# Patient Record
Sex: MALE | Race: BLACK OR AFRICAN AMERICAN | Employment: FULL TIME | URBAN - METROPOLITAN AREA
[De-identification: names, ages, dates, MRNs, and addresses within clinical notes are randomized per-mention and may not be internally consistent; named-entity substitution may affect disease eponyms.]

---

## 2017-05-31 ENCOUNTER — OFFICE VISIT (OUTPATIENT)
Dept: FAMILY MEDICINE CLINIC | Age: 22
End: 2017-05-31

## 2017-05-31 VITALS
WEIGHT: 282.2 LBS | HEART RATE: 89 BPM | TEMPERATURE: 97.5 F | DIASTOLIC BLOOD PRESSURE: 87 MMHG | RESPIRATION RATE: 18 BRPM | OXYGEN SATURATION: 96 % | HEIGHT: 72 IN | SYSTOLIC BLOOD PRESSURE: 124 MMHG | BODY MASS INDEX: 38.22 KG/M2

## 2017-05-31 DIAGNOSIS — J02.9 PHARYNGITIS, UNSPECIFIED ETIOLOGY: Primary | ICD-10-CM

## 2017-05-31 RX ORDER — AMOXICILLIN 500 MG/1
500 CAPSULE ORAL 2 TIMES DAILY
Qty: 20 CAP | Refills: 0 | Status: SHIPPED | OUTPATIENT
Start: 2017-05-31 | End: 2017-06-10

## 2017-05-31 NOTE — PROGRESS NOTES
Estephania Craft is a 24 y.o. male who is being seen  today for an acute care visit  today (5/31/2017). Assessments and plans as follows:     Assessment & Plan:  Judd Coulter was seen today for sore throat. Diagnoses and all orders for this visit:    Pharyngitis, unspecified etiology  -     amoxicillin (AMOXIL) 500 mg capsule; Take 1 Cap by mouth two (2) times a day for 10 days. Indications: PHARYNGITIS         ----------------------------------------------------------------------    Subjective / HPI:  Estephania Craft presents to office today for an acute care visit for sore throat that started yesterday he attempted home remedy without success. NO fevers, or chills , positive abdominal cramps that comes and goes. No ear or eye problems. Prior to Admission medications    Medication Sig Start Date End Date Taking? Authorizing Provider   amoxicillin (AMOXIL) 500 mg capsule Take 1 Cap by mouth two (2) times a day for 10 days. Indications: PHARYNGITIS 5/31/17 6/10/17 Yes Oxford Amee, NP   fluticasone (FLONASE) 50 mcg/actuation nasal spray 2 sprays each nostril BID for 3 days then 2 sprays each nostril daily for additional 11 days. 10/28/16  Yes Rosalentravis Stone, DO          No Known Allergies        ROS    See HPI    History reviewed. No pertinent past medical history. Visit Vitals    /87 (BP 1 Location: Left arm, BP Patient Position: Sitting)    Pulse 89    Temp 97.5 °F (36.4 °C) (Oral)    Resp 18    Ht 6' (1.829 m)    Wt 282 lb 3.2 oz (128 kg)    SpO2 96%    BMI 38.27 kg/m2       Objective:   Physical Exam   Constitutional: He is oriented to person, place, and time. He appears well-developed and well-nourished. HENT:   Mouth/Throat: Posterior oropharyngeal edema and posterior oropharyngeal erythema present.    Pulmonary/Chest: Effort normal. Lymphadenopathy:     He has cervical adenopathy. Neurological: He is alert and oriented to person, place, and time. Skin: Skin is warm and dry. Psychiatric: He has a normal mood and affect. Disclaimer:  Advised him to call back or return to office if symptoms worsen/change/persist.  Discussed expected course/resolution/complications of diagnosis in detail with patient. Medication risks/benefits/costs/interactions/alternatives discussed with patient. He was given an after visit summary which includes diagnoses, current medications, & vitals. He expressed understanding with the diagnosis and plan.         Electronic Signature  Toni Rojas NP , [facility]  [phone]

## 2017-05-31 NOTE — PATIENT INSTRUCTIONS
Sore Throat: Care Instructions  Your Care Instructions    Infection by bacteria or a virus causes most sore throats. Cigarette smoke, dry air, air pollution, allergies, and yelling can also cause a sore throat. Sore throats can be painful and annoying. Fortunately, most sore throats go away on their own. If you have a bacterial infection, your doctor may prescribe antibiotics. Follow-up care is a key part of your treatment and safety. Be sure to make and go to all appointments, and call your doctor if you are having problems. It's also a good idea to know your test results and keep a list of the medicines you take. How can you care for yourself at home? · If your doctor prescribed antibiotics, take them as directed. Do not stop taking them just because you feel better. You need to take the full course of antibiotics. · Gargle with warm salt water once an hour to help reduce swelling and relieve discomfort. Use 1 teaspoon of salt mixed in 1 cup of warm water. · Take an over-the-counter pain medicine, such as acetaminophen (Tylenol), ibuprofen (Advil, Motrin), or naproxen (Aleve). Read and follow all instructions on the label. · Be careful when taking over-the-counter cold or flu medicines and Tylenol at the same time. Many of these medicines have acetaminophen, which is Tylenol. Read the labels to make sure that you are not taking more than the recommended dose. Too much acetaminophen (Tylenol) can be harmful. · Drink plenty of fluids. Fluids may help soothe an irritated throat. Hot fluids, such as tea or soup, may help decrease throat pain. · Use over-the-counter throat lozenges to soothe pain. Regular cough drops or hard candy may also help. These should not be given to young children because of the risk of choking. · Do not smoke or allow others to smoke around you. If you need help quitting, talk to your doctor about stop-smoking programs and medicines.  These can increase your chances of quitting for good. · Use a vaporizer or humidifier to add moisture to your bedroom. Follow the directions for cleaning the machine. When should you call for help? Call your doctor now or seek immediate medical care if:  · You have new or worse trouble swallowing. · Your sore throat gets much worse on one side. Watch closely for changes in your health, and be sure to contact your doctor if you do not get better as expected. Where can you learn more? Go to http://darin-carol.info/. Enter 062 [id number] 80 [id number] in the search box to learn more about \"Sore Throat: Care Instructions. \"  Current as of: July 29, 2016  Content Version: 11.2  © 8001-0669 [employer]. Care instructions adapted under license by [employer] (which disclaims liability or warranty for this information). If you have questions about a medical condition or this instruction, always ask your healthcare professional. [address] any warranty or liability for your use of this information.

## 2017-05-31 NOTE — MR AVS SNAPSHOT
Visit Information Date & Time Provider Department Dept. Phone Encounter #  
 5/31/2017  2:40 PM Caden Shannon  [facility] [phone] [id number] Your Appointments 7/21/2017  9:00 AM  
COMPLETE PHYSICAL with Renita Gregorio MD  
[facility]) Appt Note: cpe  
 [address] [phone]  
[phone]  
  
   
 [address] [address] Upcoming Health Maintenance Date Due DTaP/Tdap/Td series (1 - Tdap) 8/9/2015 INFLUENZA AGE 9 TO ADULT 8/1/2017 Allergies as of 5/31/2017  Review Complete On: 5/31/2017 By: Caden Shannon NP No Known Allergies Current Immunizations  Reviewed on 10/28/2016 No immunizations on file. Not reviewed this visit You Were Diagnosed With   
  
 Codes Comments Pharyngitis, unspecified etiology    -  Primary ICD-10-CM: J02.9 ICD-9-CM: 982 Vitals BP Pulse Temp Resp Height(growth percentile) Weight(growth percentile) 124/87 (BP 1 Location: Left arm, BP Patient Position: Sitting) 89 97.5 °F (36.4 °C) (Oral) 18 6' (1.829 m) 282 lb 3.2 oz (128 kg) SpO2 BMI Smoking Status 96% 38.27 kg/m2 Never Smoker Vitals History BMI and BSA Data Body Mass Index Body Surface Area  
 38.27 kg/m 2 2.55 m 2 Preferred Pharmacy Pharmacy Name Phone [id number] [address] [phone] Your Updated Medication List  
  
   
This list is accurate as of: 5/31/17  3:02 PM.  Always use your most recent med list.  
  
  
  
  
 amoxicillin 500 mg capsule Commonly known as:  AMOXIL Take 1 Cap by mouth two (2) times a day for 10 days. Indications: PHARYNGITIS  
  
 fluticasone 50 mcg/actuation nasal spray Commonly known as:  FLONASE  
2 sprays each nostril BID for 3 days then 2 sprays each nostril daily for additional 11 days. Prescriptions Sent to Pharmacy Refills  
 amoxicillin (AMOXIL) 500 mg capsule 0 Sig: Take 1 Cap by mouth two (2) times a day for 10 days. Indications: PHARYNGITIS Class: Normal  
 Pharmacy: [id number] #: [phone] Route: Oral  
  
Patient Instructions Sore Throat: Care Instructions Your Care Instructions Infection by bacteria or a virus causes most sore throats. Cigarette smoke, dry air, air pollution, allergies, and yelling can also cause a sore throat. Sore throats can be painful and annoying. Fortunately, most sore throats go away on their own. If you have a bacterial infection, your doctor may prescribe antibiotics. Follow-up care is a key part of your treatment and safety. Be sure to make and go to all appointments, and call your doctor if you are having problems. It's also a good idea to know your test results and keep a list of the medicines you take. How can you care for yourself at home? · If your doctor prescribed antibiotics, take them as directed. Do not stop taking them just because you feel better. You need to take the full course of antibiotics. · Gargle with warm salt water once an hour to help reduce swelling and relieve discomfort. Use 1 teaspoon of salt mixed in 1 cup of warm water. · Take an over-the-counter pain medicine, such as acetaminophen (Tylenol), ibuprofen (Advil, Motrin), or naproxen (Aleve). Read and follow all instructions on the label. · Be careful when taking over-the-counter cold or flu medicines and Tylenol at the same time. Many of these medicines have acetaminophen, which is Tylenol. Read the labels to make sure that you are not taking more than the recommended dose. Too much acetaminophen (Tylenol) can be harmful. · Drink plenty of fluids. Fluids may help soothe an irritated throat. Hot fluids, such as tea or soup, may help decrease throat pain. · Use over-the-counter throat lozenges to soothe pain. Regular cough drops or hard candy may also help. These should not be given to young children because of the risk of choking. · Do not smoke or allow others to smoke around you. If you need help quitting, talk to your doctor about stop-smoking programs and medicines. These can increase your chances of quitting for good. · Use a vaporizer or humidifier to add moisture to your bedroom. Follow the directions for cleaning the machine. When should you call for help? Call your doctor now or seek immediate medical care if: 
· You have new or worse trouble swallowing. · Your sore throat gets much worse on one side. Watch closely for changes in your health, and be sure to contact your doctor if you do not get better as expected. Where can you learn more? Go to http://adrin-carol.info/. Enter [id number] in the search box to learn more about \"Sore Throat: Care Instructions. \" Current as of: July 29, 2016 Content Version: 11.2 © 9165-5772 [employer]. Care instructions adapted under license by [employer] (which disclaims liability or warranty for this information). If you have questions about a medical condition or this instruction, always ask your healthcare professional. [address] any warranty or liability for your use of this information. Introducing [facility]! Brando Esteves introduces [employer] patient portal. Now you can access parts of your medical record, email your doctor's office, and request medication refills online. 1. In your internet browser, go to [email or web address] 2. Click on the First Time User? Click Here link in the Sign In box. You will see the New Member Sign Up page. 3. Enter your [employer] Access Code exactly as it appears below. You will not need to use this code after youve completed the sign-up process.  If you do not sign up before the expiration date, you must request a new code. · Lytics Access Code: [address] [place] Expires: 8/29/2017  2:59 PM 
 
4. Enter the last four digits of your Social Security Number (xxxx) and Date of Birth (mm/dd/yyyy) as indicated and click Submit. You will be taken to the next sign-up page. 5. Create a Lytics ID. This will be your Lytics login ID and cannot be changed, so think of one that is secure and easy to remember. 6. Create a Lytics password. You can change your password at any time. 7. Enter your Password Reset Question and Answer. This can be used at a later time if you forget your password. 8. Enter your e-mail address. You will receive e-mail notification when new information is available in [address] 9. Click Sign Up. You can now view and download portions of your medical record. 10. Click the Download Summary menu link to download a portable copy of your medical information. If you have questions, please visit the Frequently Asked Questions section of the Lytics website. Remember, [employer] is NOT to be used for urgent needs. For medical emergencies, dial 911. Now available from your iPhone and Android! Please provide this summary of care documentation to your next provider. Your primary care clinician is listed as Merline Moody. If you have any questions after today's visit, please call [phone].

## 2017-05-31 NOTE — PROGRESS NOTES
\"Reviewed record in preparation for visit and have obtained the necessary documentation\"  Chief Complaint   Patient presents with    Sore Throat     Patient presents in the office today with complaints of sore throat and x 1 day    Patient denies any other symptoms, pain is currently 4-5/10 in throat     1. Have you been to the ER, urgent care clinic since your last visit? Hospitalized since your last visit? No    2. Have you seen or consulted any other health care providers outside of the [address] [place] [address] since your last visit? Include any pap smears or colon screening.  No

## 2017-06-29 ENCOUNTER — OFFICE VISIT (OUTPATIENT)
Dept: FAMILY MEDICINE CLINIC | Age: 22
End: 2017-06-29

## 2017-06-29 VITALS
RESPIRATION RATE: 14 BRPM | DIASTOLIC BLOOD PRESSURE: 87 MMHG | BODY MASS INDEX: 38.87 KG/M2 | TEMPERATURE: 98.3 F | HEART RATE: 85 BPM | WEIGHT: 287 LBS | OXYGEN SATURATION: 97 % | HEIGHT: 72 IN | SYSTOLIC BLOOD PRESSURE: 137 MMHG

## 2017-06-29 DIAGNOSIS — Z11.3 SCREEN FOR STD (SEXUALLY TRANSMITTED DISEASE): Primary | ICD-10-CM

## 2017-06-29 LAB
BILIRUB UR QL STRIP: NEGATIVE
GLUCOSE UR-MCNC: NEGATIVE MG/DL
KETONES P FAST UR STRIP-MCNC: NEGATIVE MG/DL
PH UR STRIP: 6 [PH] (ref 4.6–8)
PROT UR QL STRIP: NEGATIVE MG/DL
SP GR UR STRIP: 1.02 (ref 1–1.03)
UA UROBILINOGEN AMB POC: NORMAL (ref 0.2–1)
URINALYSIS CLARITY POC: CLEAR
URINALYSIS COLOR POC: YELLOW
URINE BLOOD POC: NEGATIVE
URINE LEUKOCYTES POC: NORMAL
URINE NITRITES POC: NEGATIVE

## 2017-06-29 NOTE — PROGRESS NOTES
Patient Name: Prosper Devries   MRN: [id number]    Fidel Galindo is a 24 y.o. male who presents with the following:     STD check  Patient reports that his girlfriend told him today that she has chlamydia. He reports that they are monogamous with one another. He states that in the past he has had intermittent lower abdominal pain but none recently. Denies current fevers, nausea, vomiting, dysuria, penile discharge, skin changes. States that he got all 3 HPV vaccines. Has not been tested for STDs in the past.      Review of Systems   Constitutional: Negative for fever, malaise/fatigue and weight loss. Respiratory: Negative for cough, hemoptysis, shortness of breath and wheezing. Cardiovascular: Negative for chest pain, palpitations, leg swelling and PND. Gastrointestinal: Negative for abdominal pain, constipation, diarrhea, nausea and vomiting. The patient's medications, allergies, past medical history, surgical history, family history and social history were reviewed and updated where appropriate. Prior to Admission medications    Medication Sig Start Date End Date Taking? Authorizing Provider   fluticasone (FLONASE) 50 mcg/actuation nasal spray 2 sprays each nostril BID for 3 days then 2 sprays each nostril daily for additional 11 days. 10/28/16   Denyce Maggie, DO       No Known Allergies        OBJECTIVE    Visit Vitals    /87 (BP 1 Location: Left arm, BP Patient Position: Sitting)    Pulse 85    Temp 98.3 °F (36.8 °C) (Oral)    Resp 14    Ht 6' (1.829 m)    Wt 287 lb (130.2 kg)    SpO2 97%    BMI 38.92 kg/m2       Physical Exam   Constitutional: He is oriented to person, place, and time and well-developed, well-nourished, and in no distress. No distress. HENT:   Head: Normocephalic and atraumatic. Right Ear: External ear normal.   Left Ear: External ear normal.   Eyes: Conjunctivae and EOM are normal. Pupils are equal, round, and reactive to light. Neurological: He is alert and oriented to person, place, and time. He exhibits normal muscle tone. Gait normal.   Skin: He is not diaphoretic. Psychiatric: Mood, memory, affect and judgment normal.   Nursing note and vitals reviewed. ASSESSMENT AND PLAN  Sarah Thornton is a 24 y.o. male who presents today for:    1. Screen for STD (sexually transmitted disease)  Will obtain STD panel today, and treat as needed. Patient remains asymptomatic at this time. - AMB POC URINALYSIS DIP STICK AUTO W/O MICRO  - CHLAMYDIA/GC PCR  - CULTURE, URINE  - HIV 1/2 AG/AB, 4TH GENERATION,W RFLX CONFIRM  - RPR  - HCV AB W/RFLX TO KIKA    There are no discontinued medications. Follow-up Disposition:  Return if symptoms worsen or fail to improve. Medication risks/benefits/costs/interactions/alternatives discussed with patient. Advised patient to call back or return to office if symptoms worsen/change/persist. If patient cannot reach us or should anything more severe/urgent arise he/she should proceed directly to the nearest emergency department. Discussed expected course/resolution/complications of diagnosis in detail with patient. Patient given a written after visit summary which includes his/her diagnoses, current medications and vitals. Patient expressed understanding with the diagnosis and plan.      Ely France M.D.

## 2017-06-29 NOTE — PROGRESS NOTES
1. Have you been to the ER, urgent care clinic since your last visit? Hospitalized since your last visit? No    2. Have you seen or consulted any other health care providers outside of the [address] [place] [address] since your last visit? Include any pap smears or colon screening. No       Chief Complaint   Patient presents with    Abdominal Pain     lower abdominal pain since yesterday- pain comes and goes. States it feels like pressure pain. Rate when pain comes- 5.       Not fasting

## 2017-06-29 NOTE — MR AVS SNAPSHOT
Visit Information Date & Time Provider Department Dept. Phone Encounter #  
 6/29/2017  4:00 PM Tyrone Romo  [place] [phone] [id number] Follow-up Instructions Return if symptoms worsen or fail to improve. Your Appointments 7/21/2017  9:00 AM  
COMPLETE PHYSICAL with [address], [place]  
[facility]) Appt Note: cpe  
 [address] [phone]  
[phone]  
  
   
 [address] [phone] Upcoming Health Maintenance Date Due DTaP/Tdap/Td series (1 - Tdap) 8/9/2015 INFLUENZA AGE 9 TO ADULT 8/1/2017 Allergies as of 6/29/2017  Review Complete On: 6/29/2017 By: Allie Martinez LPN No Known Allergies Current Immunizations  Reviewed on 10/28/2016 No immunizations on file. Not reviewed this visit You Were Diagnosed With   
  
 Codes Comments Lower abdominal pain    -  Primary ICD-10-CM: R10.30 ICD-9-CM: 789.09 Screen for STD (sexually transmitted disease)     ICD-10-CM: Z11.3 ICD-9-CM: V74.5 Vitals BP Pulse Temp Resp Height(growth percentile) Weight(growth percentile) 137/87 (BP 1 Location: Left arm, BP Patient Position: Sitting) 85 98.3 °F (36.8 °C) (Oral) 14 6' (1.829 m) 287 lb (130.2 kg) SpO2 BMI Smoking Status 97% 38.92 kg/m2 Never Smoker Vitals History BMI and BSA Data Body Mass Index Body Surface Area  
 38.92 kg/m 2 2.57 m 2 Preferred Pharmacy Pharmacy Name Phone [id number], [address] [place] [address] [phone] Your Updated Medication List  
  
   
This list is accurate as of: 6/29/17  4:52 PM.  Always use your most recent med list.  
  
  
  
  
 fluticasone 50 mcg/actuation nasal spray Commonly known as:  FLONASE  
2 sprays each nostril BID for 3 days then 2 sprays each nostril daily for additional 11 days. We Performed the Following AMB POC URINALYSIS DIP STICK AUTO W/O MICRO [59591 CPT(R)] CHLAMYDIA/GC PCR [35192 CPT(R)] CULTURE, URINE [id number] CPT(R)] HCV AB W/RFLX TO KIKA [18468 CPT(R)] HIV 1/2 AG/AB, 4TH GENERATION,W RFLX CONFIRM [[id number] Custom] RPR [30480 CPT(R)] Follow-up Instructions Return if symptoms worsen or fail to improve. Patient Instructions Exposure to Sexually Transmitted Infections: Care Instructions Your Care Instructions Sexually transmitted infections (STIs) are those diseases spread by sexual contact. There are at least 20 different STIs, including chlamydia, gonorrhea, syphilis, and human immunodeficiency virus (HIV), which causes AIDS. Bacteria-caused STIs can be treated and cured. STIs caused by viruses, such as HIV, can be treated but not cured. Some STIs can reduce a woman's chances of getting pregnant in the future. STIs are spread during sexual contact, such as vaginal intercourse and oral or anal sex. Follow-up care is a key part of your treatment and safety. Be sure to make and go to all appointments, and call your doctor if you are having problems. Its also a good idea to know your test results and keep a list of the medicines you take. How can you care for yourself at home? · Your doctor may have given you a shot of antibiotics. If your doctor prescribed antibiotic pills, take them as directed. Do not stop taking them just because you feel better. You need to take the full course of antibiotics. · Do not have sexual contact while you have symptoms of an STI or are being treated for an STI. · Tell your sex partner (or partners) that he or she will need treatment. · If you are a woman, do not douche. Douching changes the normal balance of bacteria in the vagina and may spread an infection up into your reproductive organs. To prevent exposure to STIs in the future · Use latex condoms every time you have sex. Use them from the beginning to the end of sexual contact. · Talk to your partner before you have sex. Find out if he or she has or is at risk for any STI. Keep in mind that a person may be able to spread an STI even if he or she does not have symptoms. · Do not have sex if you are being treated for an STI. · Do not have sex with anyone who has symptoms of an STI, such as sores on the genitals or mouth. · Having one sex partner (who does not have STIs and does not have sex with anyone else) is a good way to avoid STIs. When should you call for help? Call your doctor now or seek immediate medical care if: 
· You have new pain in your belly or pelvis. · You have symptoms of a urinary tract infection. These may include: 
¨ Pain or burning when you urinate. ¨ A frequent need to urinate without being able to pass much urine. ¨ Pain in the flank, which is just below the rib cage and above the waist on either side of the back. ¨ Blood in your urine. ¨ A fever. · You have new or worsening pain or swelling in the scrotum. Watch closely for changes in your health, and be sure to contact your doctor if: 
· You have unusual vaginal bleeding. · You have a discharge from the vagina or penis. · You have any new symptoms, such as sores, bumps, rashes, blisters, or warts. · You have itching, tingling, pain, or burning in the genital or anal area. · You think you may have an STI. Where can you learn more? Go to http://darin-carol.info/. Enter E725 in the search box to learn more about \"Exposure to Sexually Transmitted Infections: Care Instructions. \" Current as of: March 20, 2017 Content Version: 11.3 © 9613-5145 [employer]. Care instructions adapted under license by [employer] (which disclaims liability or warranty for this information).  If you have questions about a medical condition or this instruction, always ask your healthcare professional. [place] [address] any warranty or liability for your use of this information. Introducing [facility]! Brando Esteves introduces Soundl.ly patient portal. Now you can access parts of your medical record, email your doctor's office, and request medication refills online. 1. In your internet browser, go to [email or web address] 2. Click on the First Time User? Click Here link in the Sign In box. You will see the New Member Sign Up page. 3. Enter your Curate.Ust Access Code exactly as it appears below. You will not need to use this code after youve completed the sign-up process. If you do not sign up before the expiration date, you must request a new code. · Soundl.ly Access Code: [address] [place] [address] Dr Oakley Expires: 8/29/2017  2:59 PM 
 
4. Enter the last four digits of your Social Security Number (xxxx) and Date of Birth (mm/dd/yyyy) as indicated and click Submit. You will be taken to the next sign-up page. 5. Create a Soundl.ly ID. This will be your Soundl.ly login ID and cannot be changed, so think of one that is secure and easy to remember. 6. Create a Soundl.ly password. You can change your password at any time. 7. Enter your Password Reset Question and Answer. This can be used at a later time if you forget your password. 8. Enter your e-mail address. You will receive e-mail notification when new information is available in [address] 9. Click Sign Up. You can now view and download portions of your medical record. 10. Click the Download Summary menu link to download a portable copy of your medical information. If you have questions, please visit the Frequently Asked Questions section of the Soundl.ly website. Remember, Soundl.ly is NOT to be used for urgent needs. For medical emergencies, dial 911. Now available from your iPhone and Android! Please provide this summary of care documentation to your next provider. Your primary care clinician is listed as Adrianne Burleson. If you have any questions after today's visit, please call [phone].

## 2017-06-29 NOTE — PATIENT INSTRUCTIONS
Exposure to Sexually Transmitted Infections: Care Instructions  Your Care Instructions  Sexually transmitted infections (STIs) are those diseases spread by sexual contact. There are at least 20 different STIs, including chlamydia, gonorrhea, syphilis, and human immunodeficiency virus (HIV), which causes AIDS. Bacteria-caused STIs can be treated and cured. STIs caused by viruses, such as HIV, can be treated but not cured. Some STIs can reduce a woman's chances of getting pregnant in the future. STIs are spread during sexual contact, such as vaginal intercourse and oral or anal sex. Follow-up care is a key part of your treatment and safety. Be sure to make and go to all appointments, and call your doctor if you are having problems. Its also a good idea to know your test results and keep a list of the medicines you take. How can you care for yourself at home? · Your doctor may have given you a shot of antibiotics. If your doctor prescribed antibiotic pills, take them as directed. Do not stop taking them just because you feel better. You need to take the full course of antibiotics. · Do not have sexual contact while you have symptoms of an STI or are being treated for an STI. · Tell your sex partner (or partners) that he or she will need treatment. · If you are a woman, do not douche. Douching changes the normal balance of bacteria in the vagina and may spread an infection up into your reproductive organs. To prevent exposure to STIs in the future  · Use latex condoms every time you have sex. Use them from the beginning to the end of sexual contact. · Talk to your partner before you have sex. Find out if he or she has or is at risk for any STI. Keep in mind that a person may be able to spread an STI even if he or she does not have symptoms. · Do not have sex if you are being treated for an STI. · Do not have sex with anyone who has symptoms of an STI, such as sores on the genitals or mouth.   · Having one sex partner (who does not have STIs and does not have sex with anyone else) is a good way to avoid STIs. When should you call for help? Call your doctor now or seek immediate medical care if:  · You have new pain in your belly or pelvis. · You have symptoms of a urinary tract infection. These may include:  ¨ Pain or burning when you urinate. ¨ A frequent need to urinate without being able to pass much urine. ¨ Pain in the flank, which is just below the rib cage and above the waist on either side of the back. ¨ Blood in your urine. ¨ A fever. · You have new or worsening pain or swelling in the scrotum. Watch closely for changes in your health, and be sure to contact your doctor if:  · You have unusual vaginal bleeding. · You have a discharge from the vagina or penis. · You have any new symptoms, such as sores, bumps, rashes, blisters, or warts. · You have itching, tingling, pain, or burning in the genital or anal area. · You think you may have an STI. Where can you learn more? Go to http://darin-carol.info/. Enter P993 in the search box to learn more about \"Exposure to Sexually Transmitted Infections: Care Instructions. \"  Current as of: March 20, 2017  Content Version: 11.3  © 7212-9852 [employer]. Care instructions adapted under license by [employer] (which disclaims liability or warranty for this information). If you have questions about a medical condition or this instruction, always ask your healthcare professional. [place] [address] any warranty or liability for your use of this information.

## 2017-06-30 LAB
HCV AB S/CO SERPL IA: <0.1 S/CO RATIO (ref 0–0.9)
HCV AB SERPL QL IA: NORMAL
HIV 1+2 AB+HIV1 P24 AG SERPL QL IA: NON REACTIVE
RPR SER QL: NON REACTIVE

## 2017-07-01 LAB — BACTERIA UR CULT: NO GROWTH

## 2017-07-02 LAB
C TRACH RRNA SPEC QL NAA+PROBE: NEGATIVE
N GONORRHOEA RRNA SPEC QL NAA+PROBE: NEGATIVE

## 2017-07-03 ENCOUNTER — TELEPHONE (OUTPATIENT)
Dept: FAMILY MEDICINE CLINIC | Age: 22
End: 2017-07-03

## 2017-07-03 NOTE — PROGRESS NOTES
Please notify patient regarding their test results:    Negative STD testing including HIV, RPR, Hep C, gonorrhea and chlamydia. No treatment needed at this time. Pt to RTC prn new symptoms.

## 2017-07-03 NOTE — TELEPHONE ENCOUNTER
Patient is calling in regards to his most recent blood draw on 6/30/17, patient is requesting a call back with these results as soon as possible.     Best call back # for patient: [phone]

## 2017-12-26 ENCOUNTER — HOSPITAL ENCOUNTER (EMERGENCY)
Age: 22
Discharge: HOME | End: 2017-12-26
Attending: FAMILY MEDICINE

## 2017-12-26 VITALS
DIASTOLIC BLOOD PRESSURE: 93 MMHG | WEIGHT: 312.6 LBS | TEMPERATURE: 98.3 F | HEART RATE: 92 BPM | BODY MASS INDEX: 40.12 KG/M2 | SYSTOLIC BLOOD PRESSURE: 136 MMHG | OXYGEN SATURATION: 94 % | RESPIRATION RATE: 18 BRPM | HEIGHT: 74 IN

## 2017-12-26 DIAGNOSIS — K52.9 GASTROENTERITIS, ACUTE: Primary | ICD-10-CM

## 2017-12-26 RX ORDER — ONDANSETRON 4 MG/1
4 TABLET, ORALLY DISINTEGRATING ORAL
Status: COMPLETED | OUTPATIENT
Start: 2017-12-26 | End: 2017-12-26

## 2017-12-26 RX ADMIN — ONDANSETRON 4 MG: 4 TABLET, ORALLY DISINTEGRATING ORAL at 09:20

## 2017-12-26 NOTE — DISCHARGE INSTRUCTIONS
Gastroenteritis: Care Instructions  Your Care Instructions    Gastroenteritis is an illness that may cause nausea, vomiting, and diarrhea. It is sometimes called \"stomach flu. \" It can be caused by bacteria or a virus. You will probably begin to feel better in 1 to 2 days. In the meantime, get plenty of rest and make sure you do not become dehydrated. Dehydration occurs when your body loses too much fluid. Follow-up care is a key part of your treatment and safety. Be sure to make and go to all appointments, and call your doctor if you are having problems. It's also a good idea to know your test results and keep a list of the medicines you take. How can you care for yourself at home? · If your doctor prescribed antibiotics, take them as directed. Do not stop taking them just because you feel better. You need to take the full course of antibiotics. · Drink plenty of fluids to prevent dehydration, enough so that your urine is light yellow or clear like water. Choose water and other caffeine-free clear liquids until you feel better. If you have kidney, heart, or liver disease and have to limit fluids, talk with your doctor before you increase your fluid intake. · Drink fluids slowly, in frequent, small amounts, because drinking too much too fast can cause vomiting. · Begin eating mild foods, such as dry toast, yogurt, applesauce, bananas, and rice. Avoid spicy, hot, or high-fat foods, and do not drink alcohol or caffeine for a day or two. Do not drink milk or eat ice cream until you are feeling better. How to prevent gastroenteritis  · Keep hot foods hot and cold foods cold. · Do not eat meats, dressings, salads, or other foods that have been kept at room temperature for more than 2 hours. · Use a thermometer to check your refrigerator. It should be between 34°F and 40°F.  · Defrost meats in the refrigerator or microwave, not on the kitchen counter. · Keep your hands and your kitchen clean.  Wash your hands, cutting boards, and countertops with hot soapy water frequently. · Cook meat until it is well done. · Do not eat raw eggs or uncooked sauces made with raw eggs. · Do not take chances. If food looks or tastes spoiled, throw it out. When should you call for help? Call 911 anytime you think you may need emergency care. For example, call if:  ? · You vomit blood or what looks like coffee grounds. ? · You passed out (lost consciousness). ? · You pass maroon or very bloody stools. ?Call your doctor now or seek immediate medical care if:  ? · You have severe belly pain. ? · You have signs of needing more fluids. You have sunken eyes, a dry mouth, and pass only a little dark urine. ? · You feel like you are going to faint. ? · You have increased belly pain that does not go away in 1 to 2 days. ? · You have new or increased nausea, or you are vomiting. ? · You have a new or higher fever. ? · Your stools are black and tarlike or have streaks of blood. ? Watch closely for changes in your health, and be sure to contact your doctor if:  ? · You are dizzy or lightheaded. ? · You urinate less than usual, or your urine is dark yellow or brown. ? · You do not feel better with each day that goes by. Where can you learn more? Go to http://darin-carol.info/. Enter N142 in the search box to learn more about \"Gastroenteritis: Care Instructions. \"  Current as of: March 3, 2017  Content Version: 11.4  © 3104-4848 [employer]. Care instructions adapted under license by [employer] (which disclaims liability or warranty for this information). If you have questions about a medical condition or this instruction, always ask your healthcare professional. [address] any warranty or liability for your use of this information.

## 2017-12-26 NOTE — UC PROVIDER NOTE
Patient is a 20 y.o. male presenting with nausea. The history is provided by the patient. Nausea    This is a new problem. The current episode started yesterday. The problem has not changed since onset. There has been no fever. Associated symptoms include abdominal pain, diarrhea and myalgias. Pertinent negatives include no chills, no fever, no sweats, no headaches, no arthralgias, no cough, no URI and no headaches. Risk factors include ill contacts. History reviewed. No pertinent past medical history. History reviewed. No pertinent surgical history. Family History   Problem Relation Age of Onset    Hypertension Mother     No Known Problems Father         Social History     Social History    Marital status: SINGLE     Spouse name: N/A    Number of children: N/A    Years of education: N/A     Occupational History    Not on file. Social History Main Topics    Smoking status: Never Smoker    Smokeless tobacco: Never Used    Alcohol use 0.6 oz/week     1 Glasses of wine per week    Drug use: No    Sexual activity: Yes     Partners: Female     Birth control/ protection: None     Other Topics Concern    Not on file     Social History Narrative                ALLERGIES: Review of patient's allergies indicates no known allergies. Review of Systems   Constitutional: Negative for chills and fever. Respiratory: Negative for cough, shortness of breath and wheezing. Cardiovascular: Negative for chest pain and palpitations. Gastrointestinal: Positive for abdominal pain, diarrhea and nausea. Negative for blood in stool. Musculoskeletal: Positive for myalgias. Negative for arthralgias. Neurological: Negative for headaches. Vitals:    12/26/17 0858   BP: (!) 136/93   Pulse: 92   Resp: 18   Temp: 98.3 °F (36.8 °C)   SpO2: 94%   Weight: 141.8 kg (312 lb 9.6 oz)   Height: 6' 2\" (1.88 m)       Physical Exam   Constitutional: He appears well-developed and well-nourished. No distress. Cardiovascular: Normal rate, regular rhythm and normal heart sounds. Pulmonary/Chest: Effort normal and breath sounds normal. No respiratory distress. He has no wheezes. He has no rales. Abdominal: Soft. Bowel sounds are normal. He exhibits no distension and no mass. There is no hepatosplenomegaly. There is tenderness (slight) in the periumbilical area. There is no rigidity, no rebound and no guarding. Neurological: He is alert. Skin: He is not diaphoretic. Psychiatric: He has a normal mood and affect. His behavior is normal. Judgment and thought content normal.   Nursing note and vitals reviewed. MDM     Differential Diagnosis; Clinical Impression; Plan:     CLINICAL IMPRESSION:  Gastroenteritis, acute  (primary encounter diagnosis)    Plan:  1. Increase fluids  2. Zofran x 1  3. RTC INI  Risk of Significant Complications, Morbidity, and/or Mortality:   Presenting problems: Moderate  Management options:   Moderate  Progress:   Patient progress:  Stable      Procedures

## 2018-04-17 ENCOUNTER — OFFICE VISIT (OUTPATIENT)
Dept: URGENT CARE | Age: 23
End: 2018-04-17

## 2018-04-17 VITALS
WEIGHT: 315 LBS | BODY MASS INDEX: 40.43 KG/M2 | TEMPERATURE: 97.2 F | RESPIRATION RATE: 16 BRPM | HEIGHT: 74 IN | SYSTOLIC BLOOD PRESSURE: 146 MMHG | OXYGEN SATURATION: 96 % | HEART RATE: 86 BPM | DIASTOLIC BLOOD PRESSURE: 99 MMHG

## 2018-04-17 DIAGNOSIS — J02.0 STREP PHARYNGITIS: Primary | ICD-10-CM

## 2018-04-17 DIAGNOSIS — J02.9 SORE THROAT: ICD-10-CM

## 2018-04-17 LAB
S PYO AG THROAT QL: POSITIVE
VALID INTERNAL CONTROL?: YES

## 2018-04-17 RX ORDER — AMOXICILLIN 875 MG/1
875 TABLET, FILM COATED ORAL EVERY 12 HOURS
Qty: 20 TAB | Refills: 0 | Status: SHIPPED | OUTPATIENT
Start: 2018-04-17 | End: 2018-04-27

## 2018-04-17 NOTE — PROGRESS NOTES
Patient is a 20 y.o. male presenting with sore throat. Sore Throat    The history is provided by the patient. This is a new problem. The current episode started 2 days ago. The problem has been gradually worsening. There has been no fever. Associated symptoms include trouble swallowing. Pertinent negatives include no congestion, no ear discharge, no ear pain, no headaches, no shortness of breath, no stridor, no swollen glands and no cough. He has tried nothing for the symptoms. History reviewed. No pertinent past medical history. History reviewed. No pertinent surgical history. Family History   Problem Relation Age of Onset    Hypertension Mother     No Known Problems Father         Social History     Social History    Marital status: SINGLE     Spouse name: N/A    Number of children: N/A    Years of education: N/A     Occupational History    Not on file. Social History Main Topics    Smoking status: Never Smoker    Smokeless tobacco: Never Used    Alcohol use 0.6 oz/week     1 Glasses of wine per week    Drug use: No    Sexual activity: Yes     Partners: Female     Birth control/ protection: None     Other Topics Concern    Not on file     Social History Narrative                ALLERGIES: Review of patient's allergies indicates no known allergies. Review of Systems   Constitutional: Negative for chills and fever. HENT: Positive for sore throat and trouble swallowing. Negative for congestion, ear discharge and ear pain. Respiratory: Negative for cough, shortness of breath, wheezing and stridor. Cardiovascular: Negative for chest pain and palpitations. Musculoskeletal: Negative for myalgias. Skin: Negative for rash. Neurological: Negative for headaches. Hematological: Negative for adenopathy.        Vitals:    04/17/18 0823   BP: (!) 146/99   Pulse: 86   Resp: 16   Temp: 97.2 °F (36.2 °C)   SpO2: 96%   Weight: 317 lb (143.8 kg)   Height: 6' 2\" (1.88 m) Physical Exam   Constitutional: He appears well-developed and well-nourished. No distress. HENT:   Right Ear: Tympanic membrane, external ear and ear canal normal.   Left Ear: Tympanic membrane, external ear and ear canal normal.   Nose: Nose normal. Right sinus exhibits no maxillary sinus tenderness and no frontal sinus tenderness. Left sinus exhibits no maxillary sinus tenderness and no frontal sinus tenderness. Mouth/Throat: Mucous membranes are normal. Oropharyngeal exudate, posterior oropharyngeal edema and posterior oropharyngeal erythema present. No tonsillar abscesses. Cardiovascular: Normal rate, regular rhythm and normal heart sounds. Pulmonary/Chest: Effort normal and breath sounds normal. No respiratory distress. He has no wheezes. He has no rales. Lymphadenopathy:     He has no cervical adenopathy. Neurological: He is alert. Skin: He is not diaphoretic. Psychiatric: He has a normal mood and affect. His behavior is normal. Judgment and thought content normal.   Nursing note and vitals reviewed. MDM    Procedures             ICD-10-CM ICD-9-CM    1. Strep pharyngitis J02.0 034.0    2. Sore throat J02.9 462 AMB POC RAPID STREP A     Medications Ordered Today   Medications    amoxicillin (AMOXIL) 875 mg tablet     Sig: Take 1 Tab by mouth every twelve (12) hours for 10 days. Dispense:  20 Tab     Refill:  0     The patients condition was discussed with the patient and they understand. The patient is to follow up with primary care doctor ,If signs and symptoms become worse the pt is to go to the ER. The patient is to take medications as prescribed.

## 2018-04-17 NOTE — MR AVS SNAPSHOT
[address] [place] [address] 
[phone] Patient: Mikal Gee MRN: [id number] KQC:1995 Visit Information Date & Time Provider Department Dept. Phone Encounter #  
 4/17/2018  8:15 AM [facility] [phone] [id number] Upcoming Health Maintenance Date Due DTaP/Tdap/Td series (1 - Tdap) 8/9/2015 Influenza Age 5 to Adult 8/1/2017 Allergies as of 4/17/2018  Review Complete On: 4/17/2018 By: Markel Pruett MD  
 No Known Allergies Current Immunizations  Reviewed on 10/28/2016 No immunizations on file. Not reviewed this visit You Were Diagnosed With   
  
 Codes Comments Strep pharyngitis    -  Primary ICD-10-CM: J02.0 ICD-9-CM: 034.0 Sore throat     ICD-10-CM: J02.9 ICD-9-CM: 204 Vitals BP Pulse Temp Resp Height(growth percentile) Weight(growth percentile) (!) 146/99 86 97.2 °F (36.2 °C) 16 6' 2\" (1.88 m) 317 lb (143.8 kg) SpO2 BMI Smoking Status 96% 40.7 kg/m2 Never Smoker Vitals History BMI and BSA Data Body Mass Index Body Surface Area 40.7 kg/m 2 2.74 m 2 Preferred Pharmacy Pharmacy Name Phone [id number] [phone] Your Updated Medication List  
  
   
This list is accurate as of 4/17/18  8:43 AM.  Always use your most recent med list.  
  
  
  
  
 amoxicillin 875 mg tablet Commonly known as:  AMOXIL Take 1 Tab by mouth every twelve (12) hours for 10 days. fluticasone 50 mcg/actuation nasal spray Commonly known as:  FLONASE  
2 sprays each nostril BID for 3 days then 2 sprays each nostril daily for additional 11 days. Prescriptions Printed Refills  
 amoxicillin (AMOXIL) 875 mg tablet 0 Sig: Take 1 Tab by mouth every twelve (12) hours for 10 days. Class: Print  Route: Oral  
 We Performed the Following AMB POC RAPID STREP A [77753 CPT(R)] Patient Instructions Strep Throat: Care Instructions Your Care Instructions Strep throat is a bacterial infection that causes sudden, severe sore throat and fever. Strep throat, which is caused by bacteria called streptococcus, is treated with antibiotics. Sometimes a strep test is necessary to tell if the sore throat is caused by strep bacteria. Treatment can help ease symptoms and may prevent future problems. Follow-up care is a key part of your treatment and safety. Be sure to make and go to all appointments, and call your doctor if you are having problems. It's also a good idea to know your test results and keep a list of the medicines you take. How can you care for yourself at home? · Take your antibiotics as directed. Do not stop taking them just because you feel better. You need to take the full course of antibiotics. · Strep throat can spread to others until 24 hours after you begin taking antibiotics. During this time, you should avoid contact with other people at work or home, especially infants and children. Do not sneeze or cough on others, and wash your hands often. Keep your drinking glass and eating utensils separate from those of others, and wash these items well in hot, soapy water. · Gargle with warm salt water at least once each hour to help reduce swelling and make your throat feel better. Use 1 teaspoon of salt mixed in 8 fluid ounces of warm water. · Take an over-the-counter pain medication, such as acetaminophen (Tylenol), ibuprofen (Advil, Motrin), or naproxen (Aleve). Read and follow all instructions on the label. · Try an over-the-counter anesthetic throat spray or throat lozenges, which may help relieve throat pain. · Drink plenty of fluids. Fluids may help soothe an irritated throat. Hot fluids, such as tea or soup, may help your throat feel better. · Eat soft solids and drink plenty of clear liquids. Flavored ice pops, ice cream, scrambled eggs, sherbet, and gelatin dessert (such as Jell-O) may also soothe the throat. · Get lots of rest. 
· Do not smoke, and avoid secondhand smoke. If you need help quitting, talk to your doctor about stop-smoking programs and medicines. These can increase your chances of quitting for good. · Use a vaporizer or humidifier to add moisture to the air in your bedroom. Follow the directions for cleaning the machine. When should you call for help? Call your doctor now or seek immediate medical care if: 
? · You have a new or higher fever. ? · You have a fever with a stiff neck or severe headache. ? · You have new or worse trouble swallowing. ? · Your sore throat gets much worse on one side. ? · Your pain becomes much worse on one side of your throat. ? Watch closely for changes in your health, and be sure to contact your doctor if: 
? · You are not getting better after 2 days (48 hours). ? · You do not get better as expected. Where can you learn more? Go to http://darin-carol.info/. Enter K625 in the search box to learn more about \"Strep Throat: Care Instructions. \" Current as of: May 12, 2017 Content Version: 11.4 © 8288-3324 Healthwise, Incorporated. Care instructions adapted under license by [employer] (which disclaims liability or warranty for this information). If you have questions about a medical condition or this instruction, always ask your healthcare professional. [place] [address] any warranty or liability for your use of this information. Introducing hospitals & HEALTH SERVICES! [facility] introduces Information Assurance patient portal. Now you can access parts of your medical record, email your doctor's office, and request medication refills online. 1. In your internet browser, go to [email or web address] 2. Click on the First Time User? Click Here link in the Sign In box. You will see the New Member Sign Up page. 3. Enter your Bokee Access Code exactly as it appears below. You will not need to use this code after youve completed the sign-up process. If you do not sign up before the expiration date, you must request a new code. · Bokee Access Code: [email or web address] Expires: 7/16/2018  8:24 AM 
 
4. Enter the last four digits of your Social Security Number (xxxx) and Date of Birth (mm/dd/yyyy) as indicated and click Submit. You will be taken to the next sign-up page. 5. Create a Bokee ID. This will be your Bokee login ID and cannot be changed, so think of one that is secure and easy to remember. 6. Create a Bokee password. You can change your password at any time. 7. Enter your Password Reset Question and Answer. This can be used at a later time if you forget your password. 8. Enter your e-mail address. You will receive e-mail notification when new information is available in [address] 9. Click Sign Up. You can now view and download portions of your medical record. 10. Click the Download Summary menu link to download a portable copy of your medical information. If you have questions, please visit the Frequently Asked Questions section of the Bokee website. Remember, Bokee is NOT to be used for urgent needs. For medical emergencies, dial 911. Now available from your iPhone and Android! Please provide this summary of care documentation to your next provider. Your primary care clinician is listed as Latha Guzman. If you have any questions after today's visit, please call [phone].

## 2018-04-17 NOTE — PATIENT INSTRUCTIONS
Strep Throat: Care Instructions  Your Care Instructions    Strep throat is a bacterial infection that causes sudden, severe sore throat and fever. Strep throat, which is caused by bacteria called streptococcus, is treated with antibiotics. Sometimes a strep test is necessary to tell if the sore throat is caused by strep bacteria. Treatment can help ease symptoms and may prevent future problems. Follow-up care is a key part of your treatment and safety. Be sure to make and go to all appointments, and call your doctor if you are having problems. It's also a good idea to know your test results and keep a list of the medicines you take. How can you care for yourself at home? · Take your antibiotics as directed. Do not stop taking them just because you feel better. You need to take the full course of antibiotics. · Strep throat can spread to others until 24 hours after you begin taking antibiotics. During this time, you should avoid contact with other people at work or home, especially infants and children. Do not sneeze or cough on others, and wash your hands often. Keep your drinking glass and eating utensils separate from those of others, and wash these items well in hot, soapy water. · Gargle with warm salt water at least once each hour to help reduce swelling and make your throat feel better. Use 1 teaspoon of salt mixed in 8 fluid ounces of warm water. · Take an over-the-counter pain medication, such as acetaminophen (Tylenol), ibuprofen (Advil, Motrin), or naproxen (Aleve). Read and follow all instructions on the label. · Try an over-the-counter anesthetic throat spray or throat lozenges, which may help relieve throat pain. · Drink plenty of fluids. Fluids may help soothe an irritated throat. Hot fluids, such as tea or soup, may help your throat feel better. · Eat soft solids and drink plenty of clear liquids.  Flavored ice pops, ice cream, scrambled eggs, sherbet, and gelatin dessert (such as Jell-O) may also soothe the throat. · Get lots of rest.  · Do not smoke, and avoid secondhand smoke. If you need help quitting, talk to your doctor about stop-smoking programs and medicines. These can increase your chances of quitting for good. · Use a vaporizer or humidifier to add moisture to the air in your bedroom. Follow the directions for cleaning the machine. When should you call for help? Call your doctor now or seek immediate medical care if:  ? · You have a new or higher fever. ? · You have a fever with a stiff neck or severe headache. ? · You have new or worse trouble swallowing. ? · Your sore throat gets much worse on one side. ? · Your pain becomes much worse on one side of your throat. ? Watch closely for changes in your health, and be sure to contact your doctor if:  ? · You are not getting better after 2 days (48 hours). ? · You do not get better as expected. Where can you learn more? Go to http://darin-carol.info/. Enter K625 in the search box to learn more about \"Strep Throat: Care Instructions. \"  Current as of: May 12, 2017  Content Version: 11.4  © 7233-4771 Healthwise, Incorporated. Care instructions adapted under license by [employer] (which disclaims liability or warranty for this information). If you have questions about a medical condition or this instruction, always ask your healthcare professional. [address] any warranty or liability for your use of this information.

## 2019-06-05 ENCOUNTER — OFFICE VISIT (OUTPATIENT)
Dept: FAMILY MEDICINE CLINIC | Age: 24
End: 2019-06-05

## 2019-06-05 VITALS
WEIGHT: 304.6 LBS | HEART RATE: 90 BPM | DIASTOLIC BLOOD PRESSURE: 71 MMHG | SYSTOLIC BLOOD PRESSURE: 129 MMHG | HEIGHT: 74 IN | TEMPERATURE: 97.3 F | BODY MASS INDEX: 39.09 KG/M2 | OXYGEN SATURATION: 96 % | RESPIRATION RATE: 17 BRPM

## 2019-06-05 DIAGNOSIS — J06.9 VIRAL URI WITH COUGH: Primary | ICD-10-CM

## 2019-06-05 RX ORDER — BENZONATATE 100 MG/1
100 CAPSULE ORAL
Qty: 30 CAP | Refills: 0 | Status: SHIPPED | OUTPATIENT
Start: 2019-06-05 | End: 2019-06-12

## 2019-06-05 NOTE — PATIENT INSTRUCTIONS
For your symptoms: Your symptoms may improve with an oral antihistamine. These are available over the counter and include: 
Loratadine/claritin Cetirizine/Zyrtec Fexofenadine/Allegra Levocetirizine/Xyzal 
 
· Your symptoms may improve with a nasal steroid. These are available over the counter and include: · Flonase (aka fluticasone) · Nasocort (aka triamcinolone) · Nasonex (aka mometasone) · Rhinocort (aka budesonide) · Increase fluid intake, especially water to thin mucous and boost the immune system. · Avoid sugar and dairy while congested since they thicken mucous. · Get plenty of rest!   
· Gargle 3 times daily and as needed in Listerine or warm salt water vinegar solutions (1 tsp salt, 1 tsp vinegar in 1 cup lukewarm water.) · Use OTC nasal saline spray up each nostril four times daily. You could also consider using a netipot with distilled water. · Use humidifier at bedtime. · Use OTC Mucinex 600 mg twice daily to loosen mucous. · Use OTC Tylenol  (up to 650mg every 6 hours) or Ibuprofen (up to 800 mg every 8 hours) as needed for pain, fever or headaches. ·  Avoid decongestants and Ibuprofen if you have high blood pressure! Return to the doctor for evaluation: · If mucous is consistently discolored yellow or green throughout the day for more than a week · If you develop worsening facial pain · If you develop a fever that will not go away · If your symptoms worsen instead of improve Upper Respiratory Infection (Cold): Care Instructions Your Care Instructions An upper respiratory infection, or URI, is an infection of the nose, sinuses, or throat. URIs are spread by coughs, sneezes, and direct contact. The common cold is the most frequent kind of URI. The flu and sinus infections are other kinds of URIs. Almost all URIs are caused by viruses. Antibiotics won't cure them. But you can treat most infections with home care.  This may include drinking lots of fluids and taking over-the-counter pain medicine. You will probably feel better in 4 to 10 days. The doctor has checked you carefully, but problems can develop later. If you notice any problems or new symptoms, get medical treatment right away. Follow-up care is a key part of your treatment and safety. Be sure to make and go to all appointments, and call your doctor if you are having problems. It's also a good idea to know your test results and keep a list of the medicines you take. How can you care for yourself at home? · To prevent dehydration, drink plenty of fluids, enough so that your urine is light yellow or clear like water. Choose water and other caffeine-free clear liquids until you feel better. If you have kidney, heart, or liver disease and have to limit fluids, talk with your doctor before you increase the amount of fluids you drink. · Take an over-the-counter pain medicine, such as acetaminophen (Tylenol), ibuprofen (Advil, Motrin), or naproxen (Aleve). Read and follow all instructions on the label. · Before you use cough and cold medicines, check the label. These medicines may not be safe for young children or for people with certain health problems. · Be careful when taking over-the-counter cold or flu medicines and Tylenol at the same time. Many of these medicines have acetaminophen, which is Tylenol. Read the labels to make sure that you are not taking more than the recommended dose. Too much acetaminophen (Tylenol) can be harmful. · Get plenty of rest. 
· Do not smoke or allow others to smoke around you. If you need help quitting, talk to your doctor about stop-smoking programs and medicines. These can increase your chances of quitting for good. When should you call for help? Call 911 anytime you think you may need emergency care. For example, call if: 
  · You have severe trouble breathing.  
 Call your doctor now or seek immediate medical care if:   · You seem to be getting much sicker.  
  · You have new or worse trouble breathing.  
  · You have a new or higher fever.  
  · You have a new rash.  
 Watch closely for changes in your health, and be sure to contact your doctor if: 
  · You have a new symptom, such as a sore throat, an earache, or sinus pain.  
  · You cough more deeply or more often, especially if you notice more mucus or a change in the color of your mucus.  
  · You do not get better as expected. Where can you learn more? Go to http://darin-carol.info/. Enter A229 in the search box to learn more about \"Upper Respiratory Infection (Cold): Care Instructions. \" Current as of: September 5, 2018 Content Version: 11.9 © 6995-4416 [employer], [employer]. Care instructions adapted under license by [employer] (which disclaims liability or warranty for this information). If you have questions about a medical condition or this instruction, always ask your healthcare professional. [address] any warranty or liability for your use of this information.

## 2019-06-05 NOTE — PROGRESS NOTES
[facility] [place] Note      Subjective:     Chief Complaint   Patient presents with    Cough     x 1 week    Nasal Congestion     x 1 week      Matt Lawton is a 24y.o. year old male who presents for evaluation of the following:    Cough   With phlegm, less over time  Associated stuffy sneezing. No known allergies or sick contacts  Tx: nyquil, mucinex  Denies fever, vomiting, headcahe      Review of Systems   Pertinent positives and negative per HPI. All other systems  reviewed are negative for a Comprehensive ROS (10+). History reviewed. No pertinent past medical history. Social History     Socioeconomic History    Marital status: SINGLE     Spouse name: Not on file    Number of children: Not on file    Years of education: Not on file    Highest education level: Not on file   Occupational History    Not on file   Social Needs    Financial resource strain: Not on file    Food insecurity:     Worry: Not on file     Inability: Not on file    Transportation needs:     Medical: Not on file     Non-medical: Not on file   Tobacco Use    Smoking status: Never Smoker    Smokeless tobacco: Never Used   Substance and Sexual Activity    Alcohol use:  Yes     Alcohol/week: 0.6 oz     Types: 1 Glasses of wine per week    Drug use: No    Sexual activity: Yes     Partners: Female     Birth control/protection: None   Lifestyle    Physical activity:     Days per week: Not on file     Minutes per session: Not on file    Stress: Not on file   Relationships    Social connections:     Talks on phone: Not on file     Gets together: Not on file     Attends Hoahaoism service: Not on file     Active member of club or organization: Not on file     Attends meetings of clubs or organizations: Not on file     Relationship status: Not on file    Intimate partner violence:     Fear of current or ex partner: Not on file     Emotionally abused: Not on file     Physically abused: Not on file     Forced sexual activity: Not on file   Other Topics Concern    Not on file   Social History Narrative    Not on file       Family History   Problem Relation Age of Onset    Hypertension Mother     No Known Problems Father        Current Outpatient Medications   Medication Sig    fluticasone (FLONASE) 50 mcg/actuation nasal spray 2 sprays each nostril BID for 3 days then 2 sprays each nostril daily for additional 11 days. No current facility-administered medications for this visit. Objective:     Vitals:    06/05/19 1712   BP: 129/71   Pulse: 90   Resp: 17   Temp: 97.3 °F (36.3 °C)   TempSrc: Oral   SpO2: 96%   Weight: 304 lb 9.6 oz (138.2 kg)   Height: 6' 2\" (1.88 m)       Physical Examination:  General: Alert, cooperative, no distress, appears stated age. Eyes: Conjunctivae clear. PERRL, EOMs intact. Ears: Normal external ear canals both ears. TM clear and mobile bilaterally   Nose: Nares normal. Septum midline. Mucosa normal. No drainage or sinus tenderness. Mouth/Throat: Lips, mucosa, and tongue normal. No oropharyngeal erythema  Neck: Supple, symmetrical, trachea midline, no adenopathy. No thyroid enlargement/tenderness/nodules  Respiratory: Breathing comfortably, in no acute respiratory distress. Clear to auscultation bilaterally. Normal inspiratory and expiratory ratio. Cardiovascular: Regular rate and rhythm, S1, S2 normal, no murmur, click, rub or gallop. Extremities with no cyanosis or edema. Pulses 2+ and symmetric radial   Abdomen: Soft, non-tender, non distended. Bowel sounds normal.   MSK: Extremities normal, atraumatic, no effusion. Gait steady and unassisted. Skin: Skin color, texture, turgor normal. No rashes or lesions on exposed skin. Lymph nodes: Cervical, supraclavicular nodes normal.  Neurologic: CNII-XII intact. Psychiatric: Affect appropriate      No visits with results within 3 Month(s) from this visit.    Latest known visit with results is:   Office Visit on 04/17/2018   Component Date Value Ref Range Status    VALID INTERNAL CONTROL POC 04/17/2018 Yes   Final    Group A Strep Ag 04/17/2018 Positive  Negative Final           Assessment/ Plan:   Diagnoses and all orders for this visit:    1. Viral URI with cough  -     benzonatate (TESSALON) 100 mg capsule; Take 1 Cap by mouth two (2) times daily as needed for Cough for up to 7 days. Mild URI. No SIRS. Trial benzonatate for cough if not improved with conservative management. Trial of otc meds for symptom relief discussed and listed in patient instructions- nasal steroid + mucinex + antihistamine + sinus rinse + otc analgesia + humidifier prn    Educated patient on red flag symptoms to warrant return to clinic or emergency room visit. I have discussed the diagnosis with the patient and the intended plan as seen in the above orders. The patient has been offered or received an after-visit summary and questions were answered concerning future plans. I have discussed medication side effects and warnings with the patient as well. Follow-up and Dispositions    · Return if symptoms worsen or fail to improve.            Signed,    Dora Lucero MD  6/5/2019

## 2019-06-05 NOTE — PROGRESS NOTES
Chief Complaint   Patient presents with    Cough     x 1 week    Nasal Congestion     x 1 week      1. Have you been to the ER, urgent care clinic since your last visit? Hospitalized since your last visit? No    2. Have you seen or consulted any other health care providers outside of the [address] [place] [address] since your last visit? Include any pap smears or colon screening.  No

## 2020-04-29 ENCOUNTER — VIRTUAL VISIT (OUTPATIENT)
Dept: FAMILY MEDICINE CLINIC | Age: 25
End: 2020-04-29

## 2020-04-29 DIAGNOSIS — G43.011 INTRACTABLE MIGRAINE WITHOUT AURA AND WITH STATUS MIGRAINOSUS: Primary | ICD-10-CM

## 2020-04-29 RX ORDER — ACETAMINOPHEN 500 MG
500 TABLET ORAL
Qty: 30 TAB | Refills: 0
Start: 2020-04-29 | End: 2020-10-26 | Stop reason: ALTCHOICE

## 2020-04-29 NOTE — PROGRESS NOTES
Chief Complaint   Patient presents with    Headache     Headache was worse yesterday 6/10 and no headache. 1. Have you been to the ER, urgent care clinic since your last visit? Hospitalized since your last visit? No    2. Have you seen or consulted any other health care providers outside of the [address] [place] [address] since your last visit? Include any pap smears or colon screening.  No

## 2020-04-29 NOTE — PROGRESS NOTES
Nate Fuentes  21 y.o. male  1995  [address] [facility]  [id number]     [address] [facility]       Encounter Date: 4/29/2020           Established Patient Visit Note: Fatimah Anderson NP    Reason for Appointment:  Chief Complaint   Patient presents with    Headache     Headache was worse yesterday 6/10 and no headache. History of Present Illness:  History provided by patient    Nate Fuentes is a 21 y.o. male who presents today for headache after eating salty food x two days ago. Took BC powder with relief, but it comes back later. Review of Systems  Review of Systems   Constitutional: Negative. HENT: Negative. Eyes: Negative. Respiratory: Negative. Cardiovascular: Negative. Gastrointestinal: Negative. Neurological: Positive for headaches. Allergies: Patient has no known allergies. Medications: (Updated to reflect final medication list after visit)    Current Outpatient Medications:     acetaminophen (TYLENOL) 500 mg tablet, Take 1 Tab by mouth every six (6) hours as needed for Pain., Disp: 30 Tab, Rfl: 0    fluticasone (FLONASE) 50 mcg/actuation nasal spray, 2 sprays each nostril BID for 3 days then 2 sprays each nostril daily for additional 11 days. , Disp: 1 Bottle, Rfl: 0    History  Patient Care Team:  Juanjose Juarez MD as PCP - General (Family Practice)  Juanjose Juarez MD as PCP - [facility] Provider    Past Medical History: he has no past medical history on file. Past Surgical History: he has no past surgical history on file. Family Medical History: family history includes Hypertension in his mother; No Known Problems in his father. Social History: he reports that he has never smoked. He has never used smokeless tobacco. He reports current alcohol use of about 1.0 standard drinks of alcohol per week. He reports that he does not use drugs. No specialty comments available.       Objective:   Vital Signs  Unable to obtain vital signs today as patient does not have equipment for this at home    Physical Exam  Constitutional:       Appearance: He is obese. Eyes:      Extraocular Movements: Extraocular movements intact. Neurological:      Mental Status: He is alert and oriented to person, place, and time. Assessment & Plan:    1. Intractable migraine without aura and with status migrainosus    - acetaminophen (TYLENOL) 500 mg tablet; Take 1 Tab by mouth every six (6) hours as needed for Pain. Dispense: 30 Tab; Refill: 0      I was in the office while conducting this encounter. Consent:  He and/or his healthcare decision maker is aware that this patient-initiated Telehealth encounter is a billable service, with coverage as determined by his insurance carrier. He is aware that he may receive a bill and has provided verbal consent to proceed: Yes    This virtual visit was conducted via [employer]. Pursuant to the emergency declaration under the [address] [place] [address] waiver authority and the [employer] Act, this Virtual  Visit was conducted to reduce the patient's risk of exposure to COVID-19 and provide continuity of care for an established patient. Services were provided through a video synchronous discussion virtually to substitute for in-person clinic visit. Due to this being a TeleHealth evaluation, many elements of the physical examination are unable to be assessed. Total Time: minutes: 11-20 minutes. I have discussed the diagnosis with the patient and the intended plan as seen in the above orders. The patient has received an after-visit summary along with patient information handout. I have discussed medication side effects and warnings with the patient as well.     Disposition  Avoid salty food  Increase water intake  Call on Friday if not improved    Ayaka Brock NP

## 2020-10-26 ENCOUNTER — OFFICE VISIT (OUTPATIENT)
Dept: FAMILY MEDICINE CLINIC | Age: 25
End: 2020-10-26
Payer: COMMERCIAL

## 2020-10-26 VITALS
WEIGHT: 315 LBS | SYSTOLIC BLOOD PRESSURE: 122 MMHG | TEMPERATURE: 99.1 F | RESPIRATION RATE: 18 BRPM | OXYGEN SATURATION: 96 % | BODY MASS INDEX: 40.43 KG/M2 | HEIGHT: 74 IN | HEART RATE: 94 BPM | DIASTOLIC BLOOD PRESSURE: 81 MMHG

## 2020-10-26 DIAGNOSIS — R36.9 PENILE DISCHARGE: ICD-10-CM

## 2020-10-26 DIAGNOSIS — N34.1 URETHRITIS, NONSPECIFIC: ICD-10-CM

## 2020-10-26 DIAGNOSIS — R30.0 BURNING WITH URINATION: Primary | ICD-10-CM

## 2020-10-26 LAB
BILIRUB UR QL STRIP: NEGATIVE
GLUCOSE UR-MCNC: NEGATIVE MG/DL
KETONES P FAST UR STRIP-MCNC: NEGATIVE MG/DL
PH UR STRIP: 6 [PH] (ref 4.6–8)
PROT UR QL STRIP: NORMAL
SP GR UR STRIP: 1.02 (ref 1–1.03)
UA UROBILINOGEN AMB POC: NORMAL (ref 0.2–1)
URINALYSIS CLARITY POC: NORMAL
URINALYSIS COLOR POC: NORMAL
URINE BLOOD POC: NORMAL
URINE LEUKOCYTES POC: NORMAL
URINE NITRITES POC: NEGATIVE

## 2020-10-26 PROCEDURE — 81003 URINALYSIS AUTO W/O SCOPE: CPT | Performed by: FAMILY MEDICINE

## 2020-10-26 PROCEDURE — 96372 THER/PROPH/DIAG INJ SC/IM: CPT | Performed by: FAMILY MEDICINE

## 2020-10-26 PROCEDURE — 99213 OFFICE O/P EST LOW 20 MIN: CPT | Performed by: FAMILY MEDICINE

## 2020-10-26 RX ORDER — CEFTRIAXONE 250 MG/8ML
250 INJECTION, POWDER, FOR SOLUTION INTRAMUSCULAR; INTRAVENOUS ONCE
Status: CANCELLED | OUTPATIENT
Start: 2020-10-26 | End: 2020-10-27

## 2020-10-26 RX ORDER — CEFTRIAXONE 250 MG/8ML
250 INJECTION, POWDER, FOR SOLUTION INTRAMUSCULAR; INTRAVENOUS ONCE
Status: COMPLETED | OUTPATIENT
Start: 2020-10-26 | End: 2020-10-26

## 2020-10-26 RX ORDER — AZITHROMYCIN 250 MG/1
1000 TABLET, FILM COATED ORAL ONCE
Qty: 4 TAB | Refills: 0 | Status: SHIPPED | OUTPATIENT
Start: 2020-10-26 | End: 2020-10-27 | Stop reason: SDUPTHER

## 2020-10-26 RX ADMIN — CEFTRIAXONE 250 MG: 250 INJECTION, POWDER, FOR SOLUTION INTRAMUSCULAR; INTRAVENOUS at 12:00

## 2020-10-26 NOTE — PROGRESS NOTES
Chief Complaint   Patient presents with    Penile Discharge     burning and discharge since 10/23/2020    Urinary Burning       HISTORY OF PRESENT ILLNESS   HPI  4 day h/o burning with urination and yellow penile discharge. No rashes or lesions. Denies urinary urgency, frequency, nocturia, hematuria, perineal or abdominal pain, nausea, vomiting, fevers, chills, sweats, back or flank pain. No testicular pain or swelling. No known STD exposures. Sexually active w/ one female partner currently. He advised her of his symptoms at onset 4 days ago. She saw her gyn that day and reports that all her testing has come back negative at this time. She is asymptomatic. Patient denies prior STD hx. States he was screened a few years ago and all was negative (per chart STD screen here 2017 negative). He took an at home HIV test a few days ago and it was negative. No otc meds taken at this time. REVIEW OF SYMPTOMS   Review of Systems   Constitutional: Negative for chills, diaphoresis, fever, malaise/fatigue and weight loss. Gastrointestinal: Negative. Negative for abdominal pain, nausea and vomiting. Genitourinary: Positive for dysuria. Negative for flank pain, frequency, hematuria and urgency. Musculoskeletal: Negative for back pain. Skin: Negative for itching and rash. PROBLEM LIST/MEDICAL HISTORY     Problem List  Date Reviewed: 10/26/2020    None              PAST SURGICAL HISTORY   History reviewed. No pertinent surgical history. MEDICATIONS     No current outpatient medications on file. No current facility-administered medications for this visit.            ALLERGIES   No Known Allergies       SOCIAL HISTORY     Social History     Socioeconomic History    Marital status: SINGLE     Spouse name: Not on file    Number of children: Not on file    Years of education: Not on file    Highest education level: Not on file   Occupational History    Occupation: [employer] Tobacco Use    Smoking status: Never Smoker    Smokeless tobacco: Never Used   Substance and Sexual Activity    Alcohol use: Yes     Alcohol/week: 1.0 standard drinks     Types: 1 Glasses of wine per week     Comment: seldom    Drug use: No    Sexual activity: Yes     Partners: Female     Birth control/protection: Condom   Other Topics Concern    Caffeine Concern Yes     Comment: cut back from ~ 6-10 sodas or tea a day to 2 drinks a week since 10-16-20        IMMUNIZATIONS    There is no immunization history on file for this patient. FAMILY HISTORY     Family History   Problem Relation Age of Onset    Hypertension Mother     No Known Problems Father          VITALS     Visit Vitals  /81 (BP 1 Location: Right arm, BP Patient Position: Sitting)   Pulse 94   Temp 99.1 °F (37.3 °C) (Temporal)   Resp 18   Ht 6' 2\" (1.88 m)   Wt 317 lb 3.2 oz (143.9 kg)   SpO2 96%   BMI 40.73 kg/m²          PHYSICAL EXAMINATION   Physical Exam  Vitals signs reviewed. Constitutional:       General: He is not in acute distress. Appearance: He is obese. He is not ill-appearing or diaphoretic. Cardiovascular:      Rate and Rhythm: Regular rhythm. Pulmonary:      Effort: Pulmonary effort is normal. No respiratory distress.    Genitourinary:     Comments: Deferred             DIAGNOSTIC DATA         LABORATORY DATA     Results for orders placed or performed in visit on 10/26/20   AMB POC URINALYSIS DIP STICK AUTO W/O MICRO     Status: None   Result Value Ref Range Status    Color (UA POC)       Clarity (UA POC)       Glucose (UA POC) Negative Negative Final    Bilirubin (UA POC) Negative Negative Final    Ketones (UA POC) Negative Negative Final    Specific gravity (UA POC) 1.025 1.001 - 1.035 Final    Blood (UA POC) Trace Negative Final    pH (UA POC) 6.0 4.6 - 8.0 Final    Protein (UA POC) 1+ Negative Final    Urobilinogen (UA POC) 0.2 mg/dL 0.2 - 1 Final    Nitrites (UA POC) Negative Negative Final    Leukocyte esterase (UA POC) 2+ Negative Final        ASSESSMENT & PLAN       ICD-10-CM ICD-9-CM    1. Burning with urination  R30.0 788. 1 CULTURE, URINE      AMB POC URINALYSIS DIP STICK AUTO W/O MICRO   2. Penile discharge  R36.9 788.7 CT/NG/T.VAGINALIS AMPLIFICATION   3.  Urethritis, nonspecific  N34.1 099.40 CT/NG/T.VAGINALIS AMPLIFICATION      cefTRIAXone (ROCEPHIN) injection 250 mg      azithromycin (ZITHROMAX) 250 mg tablet, 4 po x 1     Ceftriaxone 250 mg IM injection in office today w/o sequelae  Azithromycin 250 mg, 4 tabs po x 1  Reviewed medications, effects and potential side effects   Urethritis counseling  Urine sent for cultures as ordered above  Partner has been evaluated by her Gyn as well  STD prevention, counseling  Further follow up & other recommendations pending review of labs and clinical response to today's treatment

## 2020-10-26 NOTE — PROGRESS NOTES
Chief Complaint   Patient presents with    Penile Discharge     Burning and discharge since 10/23/2020     1. Have you been to the ER, urgent care clinic since your last visit? Hospitalized since your last visit? No    2. Have you seen or consulted any other health care providers outside of the [address] [place] [address] since your last visit? Include any pap smears or colon screening. No       IM Ceftriaxone (Rocephin) ordered by Dr. Fabián Miranda    Dose Ordered: 1mL    Concentration Ordered (vial size): 250 mg    Volume  of 1% Lidocaine for Diluent: 0.9 mL    Volume Administered to Patient: 1mL    Site of Administration (Specify All Sites): Left Glute    Calculation Dose Verified By (Two Licensed Clinical Staff): Lesli Macias LPN, Vianca Dang LPN     Administered By: Lesli Macias LPN      Per MD order, IM Ceftriaxone (Rocephin) administered to patient. Patient tolerated procedure well. Patient waited for 15 minutes after medication administration. No reactions noted.         1% Lidocaine : [employer]  1 % Lidocaine Lot Number: [id number]  1 % Lidocaine Expiration Date: 12/01/2021  1 % Lidocaine NDC: 7892-3254-41

## 2020-10-27 ENCOUNTER — TELEPHONE (OUTPATIENT)
Dept: FAMILY MEDICINE CLINIC | Age: 25
End: 2020-10-27

## 2020-10-27 DIAGNOSIS — N34.1 URETHRITIS, NONSPECIFIC: ICD-10-CM

## 2020-10-27 DIAGNOSIS — A54.01 GONOCOCCAL URETHRITIS IN MALE: Primary | ICD-10-CM

## 2020-10-27 LAB
C TRACH RRNA SPEC QL NAA+PROBE: NEGATIVE
N GONORRHOEA RRNA SPEC QL NAA+PROBE: POSITIVE
T VAGINALIS DNA SPEC QL NAA+PROBE: NEGATIVE

## 2020-10-27 RX ORDER — AZITHROMYCIN 250 MG/1
1000 TABLET, FILM COATED ORAL ONCE
Qty: 4 TAB | Refills: 0 | Status: SHIPPED | OUTPATIENT
Start: 2020-10-27 | End: 2020-10-27

## 2020-10-27 NOTE — TELEPHONE ENCOUNTER
MD Mccray,    The pharmacy is requesting clarification of sig of azithromycin. It has 2 sets of directions. Please review and change as needed. Thanks, Lorena      Previous Refill Encounter(s): 10/26/20 #4    Requested Prescriptions     Pending Prescriptions Disp Refills    azithromycin (ZITHROMAX) 250 mg tablet 4 Tab 0     Sig: Take 4 Tabs by mouth once for 1 dose.  Take 2 tablets today, then take 1 tablet daily

## 2020-10-27 NOTE — PATIENT INSTRUCTIONS
Urethritis: Care Instructions Your Care Instructions Urethritis is an infection of the tube that takes urine from the bladder to the outside of the body. This tube is called the urethra. The infection is often caused by bacteria. This can happen if you have a sexually transmitted infection (STI). But a virus may also be a cause. Urethritis is usually treated with antibiotics. Most cases clear up with treatment. Proper treatment is very important. If you don't treat it, the infection can lead to lasting damage of the urethra. Other parts of the urinary system can also be damaged. Follow-up care is a key part of your treatment and safety. Be sure to make and go to all appointments, and call your doctor if you are having problems. It's also a good idea to know your test results and keep a list of the medicines you take. How can you care for yourself at home? · If your doctor prescribed antibiotics, take them as directed. Do not stop taking them just because you feel better. You need to take the full course of antibiotics. · Take an over-the-counter pain medicine, such as acetaminophen (Tylenol), ibuprofen (Advil, Motrin), or naproxen (Aleve), if needed. Be safe with medicines. Read and follow all instructions on the label. · Do not take two or more pain medicines at the same time unless the doctor told you to. Many pain medicines have acetaminophen, which is Tylenol. Too much acetaminophen (Tylenol) can be harmful. · Your doctor may have you take phenazopyridine (Pyridium). This is a pain medicine for the urinary tract. It can turn your urine a deep red-orange. This is normal. Call your doctor if you think you are having a problem with your medicine. · Do not have sex until you are done with treatment. If you do have sex, be sure to use a condom. Your sex partner or partners should be tested too if your urethritis was caused by an STI. · If your infection was caused by an injury or chemicals, avoid those things if you can. When should you call for help? Call your doctor now or seek immediate medical care if: 
  · You can't urinate.  
  · You have symptoms of a urinary infection. For example: ? You have blood or pus in your urine. ? You have pain in your back just below your rib cage. This is called flank pain. ? You have a fever, chills, or body aches. ? It hurts to urinate. ? You have groin or belly pain.  
  · You have a hard time urinating when your bladder is full.  
  · You notice mental changes or feel confused. Watch closely for changes in your health, and be sure to contact your doctor if: 
  · You do not get better as expected. Where can you learn more? Go to http://www.aguilar.com/ Enter N452 in the search box to learn more about \"Urethritis: Care Instructions. \" Current as of: June 29, 2020               Content Version: 12.6 © 2006-2020 [employer]. Care instructions adapted under license by [employer] (which disclaims liability or warranty for this information). If you have questions about a medical condition or this instruction, always ask your healthcare professional. [place] [address] any warranty or liability for your use of this information. Gonorrhea and Chlamydia: About These Tests What is it? These tests use a sample of urine or other body fluid to look for the bacteria that cause these sexually transmitted infections (STIs). The fluid sample can come from inside the tip of the penis or from inside the rectum or vagina. Why is this test done? These tests may be done to: · Find out if your symptoms are caused by gonorrhea or chlamydia. · Find out if you have one of these infections even though you don't have symptoms.  
How can you prepare for the test? 
· If you are a woman, do not douche or use vaginal creams or medicines for at least 24 hours before the test. 
· If you are going to have a urine test, do not urinate for 1 to 2 hours before the test. 
· If you think you may have chlamydia or gonorrhea, don't have sexual intercourse until you get your test results. And you may want to have tests for other STIs, such as HIV. How is the test done? · To get a sample from the urethra or rectum, the doctor will put a small swab into the tip of the penis or inside the rectum. · To get a sample from the vagina, the doctor will first put a speculum into the vagina. A speculum is a tool to spread apart the walls of the vagina. Then he or she will use a small swab to get the fluid sample. · For a urine sample, you will collect the urine that comes out when you first start to urinate. Don't wipe the genital area clean before you urinate. How long does the test take? The test will take a few minutes. What happens after the test? 
· You will be able to go home right away. · You can go back to your usual activities right away. · If you do have an infection, don't have sexual intercourse for 7 days after you start treatment. And your sex partner(s) should also be treated. Follow-up care is a key part of your treatment and safety. Be sure to make and go to all appointments, and call your doctor if you are having problems. It's also a good idea to keep a list of the medicines you take. Ask your doctor when you can expect to have your test results. Where can you learn more? Go to http://www.gray.com/ Enter H904 in the search box to learn more about \"Gonorrhea and Chlamydia: About These Tests. \" Current as of: February 26, 2020               Content Version: 12.6 © 9684-8741 [employer]. Care instructions adapted under license by [employer] (which disclaims liability or warranty for this information).  If you have questions about a medical condition or this instruction, always ask your healthcare professional. [address] any warranty or liability for your use of this information.

## 2020-10-28 NOTE — TELEPHONE ENCOUNTER
Called pt,  verified to inform him test results and recommendations. Pt stated that he felt better with no symptoms within a few hours after injection. Pt verbalized understanding.

## 2020-10-28 NOTE — TELEPHONE ENCOUNTER
Advise pt his STD screen came back + for Gonorrhea only. The Ceftriaxone injection he got in office should effectively treat this and the oral Azithromycin can effectively treat undetected co-infections from Chlamydia. How are his symptoms? They typically should be better after ~ 3-5 days of treatment. Also please advise of the additional recommendations below    \"Retesting -- Patients diagnosed with gonorrhea are at risk for repeat infection. .. All patients should return at three months after treatment (or at the first visit thereafter within 12 months of treatment) for retesting for N. gonorrhoeae infection. \"     \"Management of sexual partners -- All individuals who have had sexual contact with patients diagnosed with N. gonorrhoeae within the past 60 days of the diagnosis should be evaluated and treated. If the patient had no sexual contact within the 60 days prior to diagnosis, the most recent sexual partner should be evaluated and treated. \"    (Mio Galvan)

## 2020-10-28 NOTE — TELEPHONE ENCOUNTER
----- Message from Nydia Andersen sent at 10/28/2020 10:16 AM EDT -----  Regarding: /Telephone  Caller's first and last name and relationship (if not the patient):   Best contact number(s): [phone]  Whose call is being returned: 's nurse  Details to clarify the request: Pt. Requesting a call back.  Pt. Is available at 1045am.

## 2020-10-28 NOTE — TELEPHONE ENCOUNTER
One was defaulted as a \"Zpack\". I clarified new rx as 4 tabs po x 1 and resubmitted electronically. Thanks.

## 2022-03-07 ENCOUNTER — OFFICE VISIT (OUTPATIENT)
Dept: FAMILY MEDICINE CLINIC | Age: 27
End: 2022-03-07
Payer: COMMERCIAL

## 2022-03-07 VITALS
SYSTOLIC BLOOD PRESSURE: 120 MMHG | DIASTOLIC BLOOD PRESSURE: 76 MMHG | HEIGHT: 74 IN | HEART RATE: 112 BPM | OXYGEN SATURATION: 95 % | RESPIRATION RATE: 16 BRPM | TEMPERATURE: 97.7 F | BODY MASS INDEX: 39.27 KG/M2 | WEIGHT: 306 LBS

## 2022-03-07 DIAGNOSIS — R35.0 FREQUENT URINATION: ICD-10-CM

## 2022-03-07 DIAGNOSIS — E11.65 UNCONTROLLED TYPE 2 DIABETES MELLITUS WITH HYPERGLYCEMIA (HCC): Primary | ICD-10-CM

## 2022-03-07 LAB
ALBUMIN SERPL-MCNC: 4.5 G/DL (ref 3.5–5)
ALBUMIN/GLOB SERPL: 1.2 {RATIO} (ref 1.1–2.2)
ALP SERPL-CCNC: 107 U/L (ref 45–117)
ALT SERPL-CCNC: 84 U/L (ref 12–78)
ANION GAP SERPL CALC-SCNC: 6 MMOL/L (ref 5–15)
AST SERPL-CCNC: 44 U/L (ref 15–37)
BASOPHILS # BLD: 0 K/UL (ref 0–0.1)
BASOPHILS NFR BLD: 1 % (ref 0–1)
BILIRUB SERPL-MCNC: 1 MG/DL (ref 0.2–1)
BILIRUB UR QL STRIP: NEGATIVE
BUN SERPL-MCNC: 11 MG/DL (ref 6–20)
BUN/CREAT SERPL: 10 (ref 12–20)
CALCIUM SERPL-MCNC: 10 MG/DL (ref 8.5–10.1)
CHLORIDE SERPL-SCNC: 96 MMOL/L (ref 97–108)
CHOLEST SERPL-MCNC: 369 MG/DL
CO2 SERPL-SCNC: 27 MMOL/L (ref 21–32)
CREAT SERPL-MCNC: 1.15 MG/DL (ref 0.7–1.3)
DIFFERENTIAL METHOD BLD: ABNORMAL
EOSINOPHIL # BLD: 0.1 K/UL (ref 0–0.4)
EOSINOPHIL NFR BLD: 2 % (ref 0–7)
ERYTHROCYTE [DISTWIDTH] IN BLOOD BY AUTOMATED COUNT: 12.4 % (ref 11.5–14.5)
GLOBULIN SER CALC-MCNC: 3.9 G/DL (ref 2–4)
GLUCOSE POC: NORMAL MG/DL
GLUCOSE SERPL-MCNC: 386 MG/DL (ref 65–100)
GLUCOSE UR-MCNC: NORMAL MG/DL
HBA1C MFR BLD HPLC: 10.9 %
HCT VFR BLD AUTO: 49 % (ref 36.6–50.3)
HDLC SERPL-MCNC: 34 MG/DL
HDLC SERPL: 10.9 {RATIO} (ref 0–5)
HGB BLD-MCNC: 17.1 G/DL (ref 12.1–17)
IMM GRANULOCYTES # BLD AUTO: 0 K/UL (ref 0–0.04)
IMM GRANULOCYTES NFR BLD AUTO: 0 % (ref 0–0.5)
KETONES P FAST UR STRIP-MCNC: NORMAL MG/DL
LDLC SERPL CALC-MCNC: ABNORMAL MG/DL (ref 0–100)
LDLC SERPL DIRECT ASSAY-MCNC: 84 MG/DL (ref 0–100)
LYMPHOCYTES # BLD: 2.6 K/UL (ref 0.8–3.5)
LYMPHOCYTES NFR BLD: 44 % (ref 12–49)
MCH RBC QN AUTO: 28.8 PG (ref 26–34)
MCHC RBC AUTO-ENTMCNC: 34.9 G/DL (ref 30–36.5)
MCV RBC AUTO: 82.6 FL (ref 80–99)
MONOCYTES # BLD: 0.4 K/UL (ref 0–1)
MONOCYTES NFR BLD: 6 % (ref 5–13)
NEUTS SEG # BLD: 2.9 K/UL (ref 1.8–8)
NEUTS SEG NFR BLD: 47 % (ref 32–75)
NRBC # BLD: 0 K/UL (ref 0–0.01)
NRBC BLD-RTO: 0 PER 100 WBC
PH UR STRIP: 5.5 [PH] (ref 4.6–8)
PLATELET # BLD AUTO: 244 K/UL (ref 150–400)
PMV BLD AUTO: 12.7 FL (ref 8.9–12.9)
POTASSIUM SERPL-SCNC: 4.4 MMOL/L (ref 3.5–5.1)
PROT SERPL-MCNC: 8.4 G/DL (ref 6.4–8.2)
PROT UR QL STRIP: NEGATIVE
RBC # BLD AUTO: 5.93 M/UL (ref 4.1–5.7)
SODIUM SERPL-SCNC: 129 MMOL/L (ref 136–145)
SP GR UR STRIP: 1.01 (ref 1–1.03)
TRIGL SERPL-MCNC: 1620 MG/DL (ref ?–150)
UA UROBILINOGEN AMB POC: NORMAL (ref 0.2–1)
URINALYSIS CLARITY POC: CLEAR
URINALYSIS COLOR POC: NORMAL
URINE BLOOD POC: NEGATIVE
URINE LEUKOCYTES POC: NEGATIVE
URINE NITRITES POC: NEGATIVE
VLDLC SERPL CALC-MCNC: ABNORMAL MG/DL
WBC # BLD AUTO: 6 K/UL (ref 4.1–11.1)

## 2022-03-07 PROCEDURE — 81003 URINALYSIS AUTO W/O SCOPE: CPT | Performed by: NURSE PRACTITIONER

## 2022-03-07 PROCEDURE — 99215 OFFICE O/P EST HI 40 MIN: CPT | Performed by: NURSE PRACTITIONER

## 2022-03-07 PROCEDURE — 83036 HEMOGLOBIN GLYCOSYLATED A1C: CPT | Performed by: NURSE PRACTITIONER

## 2022-03-07 PROCEDURE — 82962 GLUCOSE BLOOD TEST: CPT | Performed by: NURSE PRACTITIONER

## 2022-03-07 RX ORDER — LANCETS
EACH MISCELLANEOUS
Qty: 1 EACH | Refills: 11 | Status: SHIPPED | OUTPATIENT
Start: 2022-03-07 | End: 2022-06-16 | Stop reason: SDUPTHER

## 2022-03-07 RX ORDER — INSULIN GLARGINE 300 U/ML
30 INJECTION, SOLUTION SUBCUTANEOUS
Qty: 3 ML | Refills: 2 | Status: SHIPPED | OUTPATIENT
Start: 2022-03-07 | End: 2022-05-03

## 2022-03-07 RX ORDER — INSULIN PUMP SYRINGE, 3 ML
EACH MISCELLANEOUS
Qty: 1 KIT | Refills: 0 | Status: SHIPPED | OUTPATIENT
Start: 2022-03-07

## 2022-03-07 NOTE — PROGRESS NOTES
Chief Complaint   Patient presents with    Urinary Frequency     patient has been experiencing frequent urination . denies any pain. confirms recently decrease soda and tea intake, began drinking more water . denies any odor or urine . 1. Have you been to the ER, urgent care clinic since your last visit? Hospitalized since your last visit? No    2. Have you seen or consulted any other health care providers outside of the [address] [place] [address] since your last visit? Include any pap smears or colon screening.  No

## 2022-03-07 NOTE — PATIENT INSTRUCTIONS
Home Blood Sugar Test: About This Test  What is it? A home blood sugar test measures the amount of sugar (glucose) in your blood, using a small device called a blood sugar meter. It's a quick way to test your blood sugar anywhere, at any time. Why is this test done? Testing your blood sugar helps you know if your levels are in your target range. It helps you know when to take action and may help you avoid blood sugar emergencies. Testing also helps you learn how things like exercise, stress, and what you eat can affect your blood sugar. What happens before the test?  The supplies you will need for testing blood sugar include:  · A blood glucose meter. · Testing strips. These are made to be used with a specific model of meter. Make sure the strips haven't . · Sugar control solutions. Some meters require a specific solution. Many new meters are made to operate without a control solution. · Short needles called lancets for pricking your skin. · A pen-sized brady for the lancet (lancet device). It positions the lancet and controls how deeply it goes into your skin. · Clean cotton balls. These are used to stop the bleeding from the testing site. What happens during the test?  Checking your blood sugar involves pricking your finger, palm, or forearm with a lancet to collect a drop of blood. The blood drop is placed on a test strip, which you insert into the blood glucose meter. The instructions for testing are slightly different for each blood glucose meter model. Follow the instructions that came with your meter. · Wash your hands with warm, soapy water. Dry them well with a clean towel. You may also use an alcohol wipe to clean your finger or other site. But make sure your hands are dry before the test.  · Insert a clean lancet into the lancet device. · Remove a test strip from the test strip bottle. Replace the lid right away to keep moisture away from the other strips.   · Follow the instructions that came with your meter to get it ready. · Use the lancet device to stick the side of your fingertip with the lancet. Do not stick the tip of your finger. Some blood sugar meters use lancet devices that take the blood sample from other sites, such as the palm of the hand or the forearm. But the finger is usually the most accurate place to test blood sugar. · Put a drop of blood on the correct spot on the test strip. · Apply pressure with a clean cotton ball to stop the bleeding. · Follow the directions that came with the meter to get the results. · Write down the results and the time that you tested your blood. Some meters will store the results for you. How long does the test take? The blood glucose meter will show the results of the test in a minute or less. What are the possible results for the test?  The American Diabetes Association (ADA) recommends that you stay within the following blood glucose level ranges. But depending on your health, you and your doctor may set a different range for you. For nonpregnant adults with diabetes  · 80 milligrams per deciliter (mg/dL) to 130 mg/dL before a meal  · Less than 180 mg/dL 1 to 2 hours after a meal  For women who have diabetes and are pregnant  · 95 mg/dL or less before breakfast  · 120 to 140 mg/dL (or lower) 1 to 2 hours after a meal  Where can you learn more? Go to http://www.gray.com/  Enter [id number] in the search box to learn more about \"Home Blood Sugar Test: About This Test.\"  Current as of: August 31, 2020               Content Version: 13.0  © 2006-2021 [employer]. Care instructions adapted under license by [employer] (which disclaims liability or warranty for this information).  If you have questions about a medical condition or this instruction, always ask your healthcare professional. [address] any warranty or liability for your use of this information.

## 2022-03-07 NOTE — PROGRESS NOTES
Pharmacy Progress Note     PCP requested this writer see this pt. Newly diagnosed with T2DM today with A1c of 10.9%. Starting on insulin - Toujeo U-300 30 units QHS. This writer demonstrated how insulin would be injected. Dose provided in office today. Provided sample Toujeo and pen needles. Discussed dose, frequency, how to prime pen, how to inject, where to inject, and storage of insulin. Pt is very interested in learning how to decrease BG rdgs thru diet. Scheduled visit for DM education with this writer on 3/15/22 at [place] [address].    Encouraged pt to bring in glucometer to visit. There are no discontinued medications. Orders Placed This Encounter    METABOLIC PANEL, COMPREHENSIVE     Standing Status:   Future     Standing Expiration Date:   3/7/2023    CBC WITH AUTOMATED DIFF     Standing Status:   Future     Standing Expiration Date:   3/7/2023    LIPID PANEL     Standing Status:   Future     Standing Expiration Date:   3/7/2023    AMB POC URINALYSIS DIP STICK AUTO W/O MICRO    insulin glargine U-300 conc (Toujeo Max U-300 SoloStar) 300 unit/mL (3 mL) inpn     Si Units by SubCUTAneous route nightly. Indications: type 2 diabetes mellitus     Dispense:  3 mL     Refill:  2    Blood-Glucose Meter monitoring kit     Sig: Test blood sugar twice daily. DX: E11.9. Substitute supply brand accepted by insurance. Dispense:  1 Kit     Refill:  0    lancets misc     Sig: Test blood sugar twice daily. DX: E11.9. Substitute supply brand accepted by insurance. Dispense:  1 Each     Refill:  11    glucose blood VI test strips (ASCENSIA AUTODISC VI, ONE TOUCH ULTRA TEST VI) strip     Sig: Test blood sugar twice daily. DX: E11.9. Substitute supply brand accepted by insurance. Dispense:  100 Strip     Refill:  3         Leandra Her, PharmD, BCGP  Clinical Pharmacist Specialist      For Pharmacy Admin Tracking Only     CPA in place:  Yes   Recommendation Provided To: Patient/Caregiver: 3 via In person   Intervention Detail: Device Training, Patient Access Assistance/Sample Provided and Scheduled Appointment   Intervention Accepted By: Patient/Caregiver: 3   Time Spent (min): 20

## 2022-03-07 NOTE — PROGRESS NOTES
Assessment/Plan:     Diagnoses and all orders for this visit:    1. Uncontrolled type 2 diabetes mellitus with hyperglycemia (HCC)  -     insulin glargine U-300 conc (Toujeo Max U-300 SoloStar) 300 unit/mL (3 mL) inpn; 30 Units by SubCUTAneous route nightly. Indications: type 2 diabetes mellitus  -     METABOLIC PANEL, COMPREHENSIVE; Future  -     CBC WITH AUTOMATED DIFF; Future  -     LIPID PANEL; Future  -     Blood-Glucose Meter monitoring kit; Test blood sugar twice daily. DX: E11.9. Substitute supply brand accepted by insurance. -     lancets misc; Test blood sugar twice daily. DX: E11.9. Substitute supply brand accepted by insurance. -     glucose blood VI test strips (ASCENSIA AUTODISC VI, ONE TOUCH ULTRA TEST VI) strip; Test blood sugar twice daily. DX: E11.9. Substitute supply brand accepted by insurance. New diagnosis of Diabetes Type 2. A1C is 10.9%. He is stable so we will initiate treatment today with Toujeo 30 units QHS. First dose given in office today with pharmacist and insulin teaching administered. Glucometer sent to pharmacy. Will see him back in one week. He is motivated to reduce juice/soda from his diet. ER if symptoms worsen. 2. Frequent urination  -     AMB POC URINALYSIS DIP STICK AUTO W/O MICRO         Follow-up and Dispositions    · Return in about 1 week (around 3/14/2022) for Follow Up. Discussed expected course/resolution/complications of diagnosis in detail with patient. Medication risks/benefits/costs/interactions/alternatives discussed with patient. Pt was given after visit summary which includes diagnoses, current medications & vitals. Pt expressed understanding with the diagnosis and plan          Subjective:      Elizabeth Parker is a 31 y.o. male who presents for had concerns including Urinary Frequency (patient has been experiencing frequent urination . denies any pain. confirms recently decrease soda and tea intake, began drinking more water . denies any odor or urine . ). Reports a several week history of urinary frequency. Otherwise has felt well. This is his first evaluation. There is a family history of diabetes in Mother. He has attempted to cut back on sugar sweetened beverages however this did not improve his symptoms. He works as a . He lives with his girlfriend. There is no problem list on file for this patient. No Known Allergies    ROS:   Review of Systems   Constitutional: Negative for malaise/fatigue. Eyes: Negative for blurred vision. Respiratory: Negative for shortness of breath. Cardiovascular: Negative for chest pain. Genitourinary: Positive for frequency. Negative for dysuria. Objective:     Visit Vitals  /76 (BP 1 Location: Right arm, BP Patient Position: Sitting, BP Cuff Size: Adult long)   Pulse (!) 112   Temp 97.7 °F (36.5 °C) (Temporal)   Resp 16   Ht 6' 2\" (1.88 m)   Wt 306 lb (138.8 kg)   SpO2 95%   BMI 39.29 kg/m²       Vitals and Nurse Documentation reviewed. Physical Exam  Constitutional:       General: He is not in acute distress. Appearance: He is obese. Cardiovascular:      Heart sounds: S1 normal and S2 normal. No murmur heard. No friction rub. No gallop. Pulmonary:      Effort: No respiratory distress. Breath sounds: Normal breath sounds. Skin:     General: Skin is warm and dry.    Psychiatric:         Mood and Affect: Mood and affect normal.

## 2022-03-08 DIAGNOSIS — E11.65 UNCONTROLLED TYPE 2 DIABETES MELLITUS WITH HYPERGLYCEMIA (HCC): Primary | ICD-10-CM

## 2022-03-08 RX ORDER — ROSUVASTATIN CALCIUM 10 MG/1
10 TABLET, COATED ORAL
Qty: 30 TABLET | Refills: 3 | Status: SHIPPED | OUTPATIENT
Start: 2022-03-08 | End: 2022-04-05 | Stop reason: SDUPTHER

## 2022-03-08 NOTE — PROGRESS NOTES
Spoke with patient 2 identifiers confirmed. Patient was informed of lab results and adding of cholesterol medication ( patient agreed ). Patient was not able to obtain blood glucose on last evening or this evening ( has not picked up glucose monitor kit ). Informed patient once he gets kit to write down stats, patient is scheduled for follow up on 3/14/2022.   Jose M Solitario LPN

## 2022-03-08 NOTE — PROGRESS NOTES
Cholesterol is very high. Need to start Rosuvastatin 10mg once nightly. The rest of his labs are stable! His sugar on labs was less than 400 thankfully. How did his sugar look last night and this morning?

## 2022-03-09 NOTE — PROGRESS NOTES
Returned call to patient, identifiers confirmed. Patient completed blood glucose this morning  . Informed need to do in the AM and PM.  Patient scheduled for follow up on 3/15/2022 at 11:30 AM  with NINO Rodgers. 10:00 AM with Marleni Rosas PharmD.   Sukhdeep Fontenot LPN

## 2022-03-14 NOTE — PROGRESS NOTES
Pharmacy Progress Note - Diabetes Management    S/O: Mr. Jameel Ambrose is a 31 y.o. male, referred by Dr. Wendy Gomez MD, with a PMH of T2DM, was seen today for diabetes management. Patient's last A1c was 10.9% (March 2022). Interim update: Patient was last seen by PharmD on 03/07/22, in which a Toujeo sample, insulin administration education, and first dose was provided in office. Additionally, patient also saw PCP on the same day, in which he was newly diagnosed with T2DM and would like to learn how to decrease BGs through lifestyle interventions. Patient reports he has been going to the gym and eating better since his diagnosis last week. Tries not to eat until hungry and is attempting to eating foods with the lowest amount of carbohydrates. He also mentions how he has been researching diabetes as a disease state, as well as A1c and what can affect it. He started taking two cinnamon 500 mg tablets each day to further help obtain glycemic control.     SHx:  -     Medication Adherence/Access:  - Endorses adherence to current regimen?: yes    Diabetes Management:  Diabetes History:  - Family hx: mom has pre-diabetes and takes metformin  - Previous anti-hyperglycemic agents includes: N/A    Current anti-hyperglycemic regimen includes:    - Toujeo 30 units nighty    ROS:  Today, Pt endorses:  - Symptoms of Hypoglycemia: none    Self Monitoring Blood Glucose (SMBG):  - Brought in home glucometer/blood glucose log/CGM reader today:  yes  - Checks BG: fasting, after lunch, after dinner   - Fasting SMBG today: 107  - FBGs: 226, 148, 173, 116, 189  - Post-prandial: 225, 226, 226, 144, 206, 132, 124, 176, 164, 123, 100, 108    Nutrition:  - Eats 3 meals/day   - Breakfast: scrambled eggs, spinach, shredded chicken  - Lunch: salad with shredded chicken  - Dinner: salmon, spinach  - Snack(s): apples, berries  - Included more brown rice, spinach, salad, and grilled chicken in diet  - Beverage(s): water, Gatorade zero, Propel zero. Reports drinking no soda 3-4 weeks before PCP visit, but was still drinking juice. However, stopped juice since PCP appointment    Physical Activity:   yes  - Consists of going to the gym 6 days a week  - Ropes, high intensity treadmill workout, crunches    Vitals: Wt Readings from Last 3 Encounters:   03/07/22 306 lb (138.8 kg)   10/26/20 317 lb 3.2 oz (143.9 kg)   06/05/19 304 lb 9.6 oz (138.2 kg)     BP Readings from Last 3 Encounters:   03/07/22 120/76   10/26/20 122/81   06/05/19 129/71     Pulse Readings from Last 3 Encounters:   03/07/22 (!) 112   10/26/20 94   06/05/19 90     No past medical history on file. No Known Allergies    Current Outpatient Medications   Medication Sig    cinnamon bark (Cinnamon) 500 mg cap Take 2 Caplet by mouth.  rosuvastatin (CRESTOR) 10 mg tablet Take 1 Tablet by mouth nightly.  insulin glargine U-300 conc (Toujeo Max U-300 SoloStar) 300 unit/mL (3 mL) inpn 30 Units by SubCUTAneous route nightly. Indications: type 2 diabetes mellitus    Blood-Glucose Meter monitoring kit Test blood sugar twice daily. DX: E11.9. Substitute supply brand accepted by insurance.  lancets misc Test blood sugar twice daily. DX: E11.9. Substitute supply brand accepted by insurance.  glucose blood VI test strips (ASCENSIA AUTODISC VI, ONE TOUCH ULTRA TEST VI) strip Test blood sugar twice daily. DX: E11.9. Substitute supply brand accepted by insurance. No current facility-administered medications for this visit.      Lab Results   Component Value Date/Time    Sodium 129 (L) 03/07/2022 10:59 AM    Potassium 4.4 03/07/2022 10:59 AM    Chloride 96 (L) 03/07/2022 10:59 AM    CO2 27 03/07/2022 10:59 AM    Anion gap 6 03/07/2022 10:59 AM    Glucose 386 (H) 03/07/2022 10:59 AM    BUN 11 03/07/2022 10:59 AM    Creatinine 1.15 03/07/2022 10:59 AM    BUN/Creatinine ratio 10 (L) 03/07/2022 10:59 AM    GFR est AA >60 03/07/2022 10:59 AM    GFR est non-AA >60 03/07/2022 10:59 AM    Calcium 10.0 03/07/2022 10:59 AM    Bilirubin, total 1.0 03/07/2022 10:59 AM    Alk. phosphatase 107 03/07/2022 10:59 AM    Protein, total 8.4 (H) 03/07/2022 10:59 AM    Albumin 4.5 03/07/2022 10:59 AM    Globulin 3.9 03/07/2022 10:59 AM    A-G Ratio 1.2 03/07/2022 10:59 AM    ALT (SGPT) 84 (H) 03/07/2022 10:59 AM     Lab Results   Component Value Date/Time    Cholesterol, total 369 (H) 03/07/2022 10:59 AM    HDL Cholesterol 34 03/07/2022 10:59 AM    LDL,Direct 84 03/07/2022 10:59 AM    LDL, calculated Not calculated due to elevated triglyceride level 03/07/2022 10:59 AM    VLDL, calculated  03/07/2022 10:59 AM     Calculation not valid with this patient's other Lipid values. Triglyceride 1,620 (H) 03/07/2022 10:59 AM    CHOL/HDL Ratio 10.9 (H) 03/07/2022 10:59 AM     Lab Results   Component Value Date/Time    WBC 6.0 03/07/2022 10:59 AM    HGB 17.1 (H) 03/07/2022 10:59 AM    HCT 49.0 03/07/2022 10:59 AM    PLATELET 373 35/30/1464 10:59 AM    MCV 82.6 03/07/2022 10:59 AM     No results found for: MCACR, MCA1, MCA2, MCA3, MCAU, MCAU2, MCALPOCT    No results found for: HBA1C, HUY0MSES, FQV8LIDR  Hemoglobin A1c (POC)   Date Value Ref Range Status   03/07/2022 10.9 % Final      Estimated Creatinine Clearance: 142.1 mL/min (by C-G formula based on SCr of 1.15 mg/dL). A/P:    1) T2DM: Per ADA guidelines, Pt's A1c is not at goal of < 6.5%. Current SMBG trend have improved since last week's office visit. However, to obtain consistent and further glycemic control, medication changes are warranted. Metformin is considered since it is first-line therapy and combined with lifestyle interventions, can potentially decrease insulin dose over time.  - Start metformin 500 mg daily for 1-2 weeks, then increase to 1 tablet twice daily  - Continue Toujeo 30 units nightly  - Discussed pathophysiology of DM, complications, progression, BG/A1c goals.   Discussed food choices - Healthy Plate Method, carb goals, how to read a nutrition label. Discussed physical activity goal - 150 min moderate exercise per week. Discussed medications - mechanism of action, side effects, efficacy. - Discussed how CGM Freestyle Lencho 2 sensor works and suggested patient consider using it to help further obtain glycemic control    2) Primary Prevention ASCVD: Per ADA guidelines, pts age 43-69 yrs are recommended for statin therapy. Although patient does not meet criteria for age group, currently taking rosuvastatin 10 mg daily since TGs were elevated  - Continue rosuvastatin 10 mg daily    Nutrition/Lifestyle Modifications:  - Pt is extremely motivated to continue implementing lifestyle and diet interventions  - Recommend ~30 minutes consistent, moderately intensive, exercise/day or ~150 minutes/week. Start small, stay consistent, and increase length and types of exercise, as tolerated. - Discuss goal is to reduce at least 5-10% of current total body weight. Based on current weight, this would be 30 lbs. - Educate pt about reading nutrition labels w/ a focus on carbohydrate/protein/sugar/fiber/fat content.  - Recommend moderating and diversifying carbohydrate intake to max of~45 grams/meal and 15 grams/snack. Medication reconciliation was completed during the visit. There are no discontinued medications. Orders Placed This Encounter    cinnamon bark (Cinnamon) 500 mg cap     Sig: Take 2 Caplet by mouth. Patient verbalized understanding of the information presented and all of the patients questions were answered. AVS was handed to the patient. Patient advised to call the office with any additional questions or concerns. Notifications of recommendations will be sent to Dr. Lilian Allen MD for review. Patient will return to clinic in 3 week(s) for follow up.      Ankur Vazquez, PharmD  PGY1 Pharmacy Resident  [place], [place]

## 2022-03-15 ENCOUNTER — OFFICE VISIT (OUTPATIENT)
Dept: FAMILY MEDICINE CLINIC | Age: 27
End: 2022-03-15

## 2022-03-15 VITALS
RESPIRATION RATE: 16 BRPM | SYSTOLIC BLOOD PRESSURE: 140 MMHG | OXYGEN SATURATION: 97 % | TEMPERATURE: 98 F | DIASTOLIC BLOOD PRESSURE: 80 MMHG | WEIGHT: 302 LBS | HEART RATE: 93 BPM | HEIGHT: 74 IN | BODY MASS INDEX: 38.76 KG/M2

## 2022-03-15 DIAGNOSIS — E11.65 UNCONTROLLED TYPE 2 DIABETES MELLITUS WITH HYPERGLYCEMIA (HCC): Primary | ICD-10-CM

## 2022-03-15 PROCEDURE — 99214 OFFICE O/P EST MOD 30 MIN: CPT | Performed by: NURSE PRACTITIONER

## 2022-03-15 RX ORDER — MULTIVITAMIN
2 TABLET ORAL
COMMUNITY
End: 2022-06-16

## 2022-03-15 RX ORDER — METFORMIN HYDROCHLORIDE 500 MG/1
500 TABLET ORAL 2 TIMES DAILY WITH MEALS
Qty: 60 TABLET | Refills: 2 | Status: SHIPPED | OUTPATIENT
Start: 2022-03-15 | End: 2022-04-05

## 2022-03-15 NOTE — PATIENT INSTRUCTIONS
Your A1c was 10.9% which was elevated. However, your blood sugar was much improved on the insulin. START Metformin 500 mg - 1 tablet daily x1-2 weeks, then INCREASE to 1 tablet in the morning and 1 tablet in the evening. Take with food to decrease side effects. Continue Toujeo U-300 30 units. We will reassess at our next visit.     Blood Sugar Goal  Fastin-130 mg/dL  1-2 after meals: Less than 180 mg/dL    Carbohydrate Goals  Meal: 30-45 grams   Snacks: 15 grams    Personal Goals:  - 10% weight reduction (approx 30 lbs)

## 2022-03-15 NOTE — PROGRESS NOTES
Patient was seen with Filemon CorneliusD, PGY1 resident. I was present for the visit and agree with the assessment and plan. Please see their note for more details of the visit. Filemon CosbyD, [place]  Clinical Pharmacist Specialist      For Pharmacy Admin Tracking Only     CPA in place:  Yes   Recommendation Provided To: Patient/Caregiver: 3 via In person   Intervention Detail: New Rx: 2, reason: Needs Additional Therapy and Patient Preference and Scheduled Appointment   Intervention Accepted By: Patient/Caregiver: 3   Time Spent (min): 60

## 2022-03-15 NOTE — PROGRESS NOTES
Chief Complaint   Patient presents with    Follow-up    Diabetes       1. \"Have you been to the ER, urgent care clinic since your last visit? Hospitalized since your last visit? \" No    2. \"Have you seen or consulted any other health care providers outside of the [address] [place] [address] since your last visit? \" No     3. For patients over 45: Has the patient had a colonoscopy?  NA - based on age       1 most recent PHQ Screens 3/15/2022   Little interest or pleasure in doing things Not at all   Feeling down, depressed, irritable, or hopeless Not at all   Total Score PHQ 2 0     Health Maintenance Due   Topic Date Due    COVID-19 Vaccine (1) Never done    Pneumococcal 0-64 years (1 of 2 - PPSV23) Never done    Foot Exam Q1  Never done    MICROALBUMIN Q1  Never done    Eye Exam Retinal or Dilated  Never done    DTaP/Tdap/Td series (1 - Tdap) Never done    Flu Vaccine (1) Never done

## 2022-03-15 NOTE — PROGRESS NOTES
Assessment/Plan:     Diagnoses and all orders for this visit:    1. Uncontrolled type 2 diabetes mellitus with hyperglycemia (HCC)  -     MICROALBUMIN, UR, RAND W/ MICROALB/CREAT RATIO; Future  -      DIABETES FOOT EXAM     Significantly improved with normal fasting blood sugars today. He is starting Metformin this week and has scheduled follow up with pharmacist for slow taper upwards. He will re-establish with ophthalmology. He is tolerating statin without difficulty. He will re-establish with podiatry. Return in three months for repeat labs and follow up. Discussed expected course/resolution/complications of diagnosis in detail with patient. Medication risks/benefits/costs/interactions/alternatives discussed with patient. Pt was given after visit summary which includes diagnoses, current medications & vitals. Pt expressed understanding with the diagnosis and plan          Subjective:      Saleem Bradford is a 31 y.o. male who presents for had concerns including Follow-up and Diabetes. Patient is a 31 y.o. male that comes today for follow up of Diabetes Mellitus Type 2. Patient does regularly monitor  their FSBG at home. The fasting plasma glucose (fpg) usually runs around 106 mg/L. generally follows a low fat low cholesterol diet  Patients activity level: routinely active. there was no change in patient's weight. .  The patient has not experienced recent hypoglycemia. reports that he has never smoked. He has never used smokeless tobacco.    No results found for: HBA1C, [id number], [id number]      There is no problem list on file for this patient. Current Outpatient Medications   Medication Sig Dispense Refill    rosuvastatin (CRESTOR) 10 mg tablet Take 1 Tablet by mouth nightly. 30 Tablet 3    insulin glargine U-300 conc (Toujeo Max U-300 SoloStar) 300 unit/mL (3 mL) inpn 30 Units by SubCUTAneous route nightly.  Indications: type 2 diabetes mellitus 3 mL 2    Blood-Glucose Meter monitoring kit Test blood sugar twice daily. DX: E11.9. Substitute supply brand accepted by insurance. 1 Kit 0    lancets misc Test blood sugar twice daily. DX: E11.9. Substitute supply brand accepted by insurance. 1 Each 11    glucose blood VI test strips (ASCENSIA AUTODISC VI, ONE TOUCH ULTRA TEST VI) strip Test blood sugar twice daily. DX: E11.9. Substitute supply brand accepted by insurance. 100 Strip 3    cinnamon bark (Cinnamon) 500 mg cap Take 2 Caplet by mouth.  metFORMIN (GLUCOPHAGE) 500 mg tablet Take 1 Tablet by mouth two (2) times daily (with meals). 60 Tablet 2       No Known Allergies    ROS:   Review of Systems   Constitutional: Negative for malaise/fatigue. Eyes: Negative for blurred vision. Respiratory: Negative for shortness of breath. Cardiovascular: Negative for chest pain. Objective:     Visit Vitals  BP (!) 140/80 (BP 1 Location: Right upper arm, BP Patient Position: Sitting, BP Cuff Size: Large adult long)   Pulse 93   Temp 98 °F (36.7 °C) (Temporal)   Resp 16   Ht 6' 2\" (1.88 m)   Wt 302 lb (137 kg)   SpO2 97%   BMI 38.77 kg/m²       Vitals and Nurse Documentation reviewed. Physical Exam  Constitutional:       General: He is not in acute distress. Appearance: He is obese. Cardiovascular:      Heart sounds: S1 normal and S2 normal. No murmur heard. No friction rub. No gallop. Pulmonary:      Effort: No respiratory distress. Breath sounds: Normal breath sounds. Feet:      Right foot:      Protective Sensation: 4 sites tested. 4 sites sensed. Toenail Condition: Right toenails are ingrown. Fungal disease present. Left foot:      Protective Sensation: 4 sites tested. 4 sites sensed. Toenail Condition: Fungal disease present. Skin:     General: Skin is warm and dry.    Psychiatric:         Mood and Affect: Mood and affect normal.

## 2022-03-17 ENCOUNTER — PATIENT MESSAGE (OUTPATIENT)
Dept: FAMILY MEDICINE CLINIC | Age: 27
End: 2022-03-17

## 2022-03-22 NOTE — TELEPHONE ENCOUNTER
For Ravindra Allred in place:  Yes   Recommendation Provided To: Patient/Caregiver: 0 via [address] By: Patient/Caregiver: 0   Time Spent (min): 10

## 2022-03-23 ENCOUNTER — TELEPHONE (OUTPATIENT)
Dept: FAMILY MEDICINE CLINIC | Age: 27
End: 2022-03-23

## 2022-03-24 RX ORDER — PEN NEEDLE, DIABETIC 30 GX3/16"
NEEDLE, DISPOSABLE MISCELLANEOUS
Qty: 1 EACH | Refills: 11 | Status: SHIPPED | OUTPATIENT
Start: 2022-03-24 | End: 2022-06-16 | Stop reason: ALTCHOICE

## 2022-04-03 NOTE — PROGRESS NOTES
Pharmacy Progress Note - Diabetes Management    S/O: Mr. Kasey Nguyen is a 31 y.o. male, referred by Dr. Samantha Ennis MD, with a PMH of T2DM, was seen today for diabetes management. Patient's last A1c was 10.9% (March 2022). Interim update: Patient was last seen by PharmD on 03/15/22, in which patient reports he has been going to the gym and eating better since his diagnosis. Discussed food choices using the Healthy Plate Method and how CGM FreeStyle Lencho 2 works. Patient reports that he feels better than ever. He has continued going to the gym and keeping up a strict diet for the most part. He has started reintroducing certain foods in his diet, such as cookies and pizza, to see how it affects his sugars. States having diarrhea for 2 days after starting metformin, but it has subsided. Medication Adherence/Access:  - Endorses adherence to current regimen?: yes    Current anti-hyperglycemic regimen includes:    - Toujeo 30 units daily  - Metformin 1000 mg twice daily    ROS:  Today, Pt endorses:  - Symptoms of Hyperglycemia: none  - Symptoms of Hypoglycemia: none    Self Monitoring Blood Glucose (SMBG) or CGM:  - Brought in home glucometer/blood glucose log/CGM reader today:  no  - Conducts/scans: FBGs and after lunch  - FBG range: 80s-90s  - Post-prandial: 90s-120s    Physical Activity:   yes  - Consists of ropes, treadmill workouts, high intensity interval training    Vitals: Wt Readings from Last 3 Encounters:   04/05/22 297 lb 12.8 oz (135.1 kg)   03/15/22 302 lb (137 kg)   03/07/22 306 lb (138.8 kg)     BP Readings from Last 3 Encounters:   04/05/22 121/67   03/15/22 (!) 140/80   03/07/22 120/76     Pulse Readings from Last 3 Encounters:   03/15/22 93   03/07/22 (!) 112   10/26/20 94     No past medical history on file.   No Known Allergies    Current Outpatient Medications   Medication Sig    metFORMIN (GLUCOPHAGE) 1,000 mg tablet Take 1,000 mg by mouth two (2) times daily (with meals).  rosuvastatin (CRESTOR) 10 mg tablet Take 1 Tablet by mouth nightly.  Insulin Needles, Disposable, 31 gauge x 5/16\" ndle UAD once daily. DX: E11.9    cinnamon bark (Cinnamon) 500 mg cap Take 2 Caplet by mouth.  insulin glargine U-300 conc (Toujeo Max U-300 SoloStar) 300 unit/mL (3 mL) inpn 30 Units by SubCUTAneous route nightly. Indications: type 2 diabetes mellitus    Blood-Glucose Meter monitoring kit Test blood sugar twice daily. DX: E11.9. Substitute supply brand accepted by insurance.  lancets misc Test blood sugar twice daily. DX: E11.9. Substitute supply brand accepted by insurance.  glucose blood VI test strips (ASCENSIA AUTODISC VI, ONE TOUCH ULTRA TEST VI) strip Test blood sugar twice daily. DX: E11.9. Substitute supply brand accepted by insurance. No current facility-administered medications for this visit. Lab Results   Component Value Date/Time    Sodium 129 (L) 03/07/2022 10:59 AM    Potassium 4.4 03/07/2022 10:59 AM    Chloride 96 (L) 03/07/2022 10:59 AM    CO2 27 03/07/2022 10:59 AM    Anion gap 6 03/07/2022 10:59 AM    Glucose 386 (H) 03/07/2022 10:59 AM    BUN 11 03/07/2022 10:59 AM    Creatinine 1.15 03/07/2022 10:59 AM    BUN/Creatinine ratio 10 (L) 03/07/2022 10:59 AM    GFR est AA >60 03/07/2022 10:59 AM    GFR est non-AA >60 03/07/2022 10:59 AM    Calcium 10.0 03/07/2022 10:59 AM    Bilirubin, total 1.0 03/07/2022 10:59 AM    Alk.  phosphatase 107 03/07/2022 10:59 AM    Protein, total 8.4 (H) 03/07/2022 10:59 AM    Albumin 4.5 03/07/2022 10:59 AM    Globulin 3.9 03/07/2022 10:59 AM    A-G Ratio 1.2 03/07/2022 10:59 AM    ALT (SGPT) 84 (H) 03/07/2022 10:59 AM     Lab Results   Component Value Date/Time    Cholesterol, total 369 (H) 03/07/2022 10:59 AM    HDL Cholesterol 34 03/07/2022 10:59 AM    LDL,Direct 84 03/07/2022 10:59 AM    LDL, calculated Not calculated due to elevated triglyceride level 03/07/2022 10:59 AM    VLDL, calculated  03/07/2022 10:59 AM Calculation not valid with this patient's other Lipid values. Triglyceride 1,620 (H) 03/07/2022 10:59 AM    CHOL/HDL Ratio 10.9 (H) 03/07/2022 10:59 AM     Lab Results   Component Value Date/Time    WBC 6.0 03/07/2022 10:59 AM    HGB 17.1 (H) 03/07/2022 10:59 AM    HCT 49.0 03/07/2022 10:59 AM    PLATELET 028 62/20/1326 10:59 AM    MCV 82.6 03/07/2022 10:59 AM     No results found for: MCACR, MCA1, MCA2, MCA3, MCAU, MCAU2, MCALPOCT    No results found for: HBA1C, UIA5FYJI, [id number]  Hemoglobin A1c (POC)   Date Value Ref Range Status   03/07/2022 10.9 % Final      Estimated Creatinine Clearance: 141.1 mL/min (by C-G formula based on SCr of 1.15 mg/dL). A/P:    Diabetes Management:  Per ADA guidelines, Pt's A1c is not at goal of < 6.5%. Current SMBG trend is strictly within goal and patient has been progressing well with both weight loss and glycemic control. Due to the glycemic control that has been obtained and to prevent any hypoglycemic symptoms, an insulin dose adjustment is warranted. - Titrate down Toujeo by 3 units if FBG < 120 for 3 days in a row. Continue to decrease insulin by 3 units every 3 days if FBG continues to be < 120  - Continue metformin 1000 mg twice daily    Medication reconciliation was completed during the visit. Medications Discontinued During This Encounter   Medication Reason    metFORMIN (GLUCOPHAGE) 500 mg tablet LIST CLEANUP    rosuvastatin (CRESTOR) 10 mg tablet REORDER     Patient verbalized understanding of the information presented and all of the patients questions were answered. AVS was handed to the patient. Patient advised to call the office with any additional questions or concerns. Notifications of recommendations will be sent to Dr. Tru Mcdowell MD for review. Patient will return to clinic in 4 week(s) for follow up.      Chris Rowe, PharmD  PGY1 Pharmacy Resident  [place], [place]

## 2022-04-05 ENCOUNTER — OFFICE VISIT (OUTPATIENT)
Dept: FAMILY MEDICINE CLINIC | Age: 27
End: 2022-04-05

## 2022-04-05 VITALS — SYSTOLIC BLOOD PRESSURE: 121 MMHG | BODY MASS INDEX: 38.24 KG/M2 | WEIGHT: 297.8 LBS | DIASTOLIC BLOOD PRESSURE: 67 MMHG

## 2022-04-05 DIAGNOSIS — E11.65 UNCONTROLLED TYPE 2 DIABETES MELLITUS WITH HYPERGLYCEMIA (HCC): ICD-10-CM

## 2022-04-05 RX ORDER — ROSUVASTATIN CALCIUM 10 MG/1
10 TABLET, COATED ORAL
Qty: 90 TABLET | Refills: 1 | Status: SHIPPED | OUTPATIENT
Start: 2022-04-05

## 2022-04-05 RX ORDER — METFORMIN HYDROCHLORIDE 1000 MG/1
1000 TABLET ORAL 2 TIMES DAILY WITH MEALS
COMMUNITY

## 2022-04-05 NOTE — PATIENT INSTRUCTIONS
Your A1c was 10.9% which was elevated; however, your blood sugar readings indicate tight control. We are decreasing your insulin. DECREASE Toujeo by 3 units every 3 days if your blood sugar is <120 mg/dL each day. Refilled Rosuvastatin for 90 day supply.     Blood Sugar Goal  Fastin-130 mg/dL  1-2 after meals: Less than 180 mg/dL    Carbohydrate Goals  Meal: 30-45 grams   Snacks: 15 grams

## 2022-04-09 NOTE — PROGRESS NOTES
Patient was seen with Nellie Ocampo PharmD, PGY1 resident. I was present for the visit and agree with the assessment and plan. Please see their note for more details of the visit. Pt also requested refill of Rosuvastatin. Request was provided. Josselin Salguero PharmD[place] [facility] [place]ist Specialist      For Pharmacy Admin Tracking Only     CPA in place:  Yes   Recommendation Provided To: Patient/Caregiver: 3 via In person   Intervention Detail: Dose Adjustment: 1, reason: Therapy Optimization, Refill(s) Provided and Scheduled Appointment   Intervention Accepted By: Patient/Caregiver: 3   Time Spent (min): 30

## 2022-05-03 ENCOUNTER — VIRTUAL VISIT (OUTPATIENT)
Dept: FAMILY MEDICINE CLINIC | Age: 27
End: 2022-05-03

## 2022-05-03 DIAGNOSIS — E11.65 UNCONTROLLED TYPE 2 DIABETES MELLITUS WITH HYPERGLYCEMIA (HCC): ICD-10-CM

## 2022-05-03 RX ORDER — INSULIN GLARGINE 300 U/ML
10 INJECTION, SOLUTION SUBCUTANEOUS
Qty: 3 ML | Refills: 2
Start: 2022-05-03 | End: 2022-05-17 | Stop reason: ALTCHOICE

## 2022-05-03 NOTE — PROGRESS NOTES
Pharmacy Progress Note - Diabetes Management    S/O: Mr. Porsche Acevedo is a 31 y.o. male, referred by Dr. Ananias Phalen, NP, with a PMH of T2DM, was seen today for diabetes management. Patient's last A1c was 10.9% (March 2022).        Visit was completed using real time audio visual technology, [email or web address]. Interim update: Pt was last seen by PharmD on 4/5/2 for f/up on DM. Pt had made great efforts thru diet, exercise, and weight reduction. Pt was provided instructions on decreasing his long acting insulin dose. Metformin was continued. Pt logs into virtual visit today to f/up on DM management. Pt states that he has decreased Toujeo U-300 dose to 20 units. He also admits that there was nearly a week that he forgot his insulin at home while he went on a work trip. States his BG rdgs were still well controlled. Continues to eat low carb and complete a lot of physical activity. Reports his home weight is 288 lbs. Current anti-hyperglycemic regimen includes:    - Metformin 1000 mg BID  - Toujeo U-300 20 units daily    ROS:  Today, Pt endorses:  - Symptoms of Hyperglycemia: none  - Symptoms of Hypoglycemia: none    Self Monitoring Blood Glucose (SMBG) or CGM:  - Brought in home glucometer/blood glucose log/CGM reader today:  no  - Checks BG: BID - fasting and 1-2 hours after dinner  - Fasting - 90s-100s, highest 140  - PM - 100-110s, highest 125  BG rdgs without insulin  - Levels were still controlled - 90s-120s  - Denies sx of hypoglycemia; however, he did have a fasting BG of 67 mg/dL    Vitals: Wt Readings from Last 3 Encounters:   04/05/22 297 lb 12.8 oz (135.1 kg)   03/15/22 302 lb (137 kg)   03/07/22 306 lb (138.8 kg)     BP Readings from Last 3 Encounters:   04/05/22 121/67   03/15/22 (!) 140/80   03/07/22 120/76     Pulse Readings from Last 3 Encounters:   03/15/22 93   03/07/22 (!) 112   10/26/20 94       No past medical history on file.   No Known Allergies    Current Outpatient Medications   Medication Sig    metFORMIN (GLUCOPHAGE) 1,000 mg tablet Take 1,000 mg by mouth two (2) times daily (with meals).  rosuvastatin (CRESTOR) 10 mg tablet Take 1 Tablet by mouth nightly.  Insulin Needles, Disposable, 31 gauge x 5/16\" ndle UAD once daily. DX: E11.9    cinnamon bark (Cinnamon) 500 mg cap Take 2 Caplet by mouth.  insulin glargine U-300 conc (Toujeo Max U-300 SoloStar) 300 unit/mL (3 mL) inpn 30 Units by SubCUTAneous route nightly. Indications: type 2 diabetes mellitus    Blood-Glucose Meter monitoring kit Test blood sugar twice daily. DX: E11.9. Substitute supply brand accepted by insurance.  lancets misc Test blood sugar twice daily. DX: E11.9. Substitute supply brand accepted by insurance.  glucose blood VI test strips (ASCENSIA AUTODISC VI, ONE TOUCH ULTRA TEST VI) strip Test blood sugar twice daily. DX: E11.9. Substitute supply brand accepted by insurance. No current facility-administered medications for this visit. Lab Results   Component Value Date/Time    Sodium 129 (L) 03/07/2022 10:59 AM    Potassium 4.4 03/07/2022 10:59 AM    Chloride 96 (L) 03/07/2022 10:59 AM    CO2 27 03/07/2022 10:59 AM    Anion gap 6 03/07/2022 10:59 AM    Glucose 386 (H) 03/07/2022 10:59 AM    BUN 11 03/07/2022 10:59 AM    Creatinine 1.15 03/07/2022 10:59 AM    BUN/Creatinine ratio 10 (L) 03/07/2022 10:59 AM    GFR est AA >60 03/07/2022 10:59 AM    GFR est non-AA >60 03/07/2022 10:59 AM    Calcium 10.0 03/07/2022 10:59 AM    Bilirubin, total 1.0 03/07/2022 10:59 AM    Alk.  phosphatase 107 03/07/2022 10:59 AM    Protein, total 8.4 (H) 03/07/2022 10:59 AM    Albumin 4.5 03/07/2022 10:59 AM    Globulin 3.9 03/07/2022 10:59 AM    A-G Ratio 1.2 03/07/2022 10:59 AM    ALT (SGPT) 84 (H) 03/07/2022 10:59 AM     Lab Results   Component Value Date/Time    Cholesterol, total 369 (H) 03/07/2022 10:59 AM    HDL Cholesterol 34 03/07/2022 10:59 AM    LDL,Direct 84 03/07/2022 10:59 AM    LDL, calculated Not calculated due to elevated triglyceride level 03/07/2022 10:59 AM    VLDL, calculated  03/07/2022 10:59 AM     Calculation not valid with this patient's other Lipid values. Triglyceride 1,620 (H) 03/07/2022 10:59 AM    CHOL/HDL Ratio 10.9 (H) 03/07/2022 10:59 AM     Lab Results   Component Value Date/Time    WBC 6.0 03/07/2022 10:59 AM    HGB 17.1 (H) 03/07/2022 10:59 AM    HCT 49.0 03/07/2022 10:59 AM    PLATELET 051 45/75/4397 10:59 AM    MCV 82.6 03/07/2022 10:59 AM       No results found for: MCACR, MCA1, MCA2, MCA3, MCAU, MCAU2, MCALPOCT    No results found for: HBA1C, [id number]  Hemoglobin A1c (POC)   Date Value Ref Range Status   03/07/2022 10.9 % Final        CrCl cannot be calculated (Unknown ideal weight. ). A/P:    1) T2DM: Per ADA guidelines, Pt's A1c is not at goal of < 6.5%. Current SMBG(s)/CGM trend tightly controlled. Pt's weight is decreased with nonpharm interventions. Pt doing well with decreased dose of insulin. Pt had also done well without insulin for a few days. Experienced low rdg, <70 mg/dL one morning. Will reduce dose of insulin significantly and evaluate if pt can maintain control. Working towards coming off insulin. If he needs additional control, will discuss SGLT2 inhibitor or GLP-1 agonist therapy. - DECREASE Toujeo U-300 to 10 units daily  - Continue Metformin 1000 mg BID      Medication reconciliation was completed during the visit. Medications Discontinued During This Encounter   Medication Reason    insulin glargine U-300 conc (Toujeo Max U-300 SoloStar) 300 unit/mL (3 mL) inpn      Orders Placed This Encounter    insulin glargine U-300 conc (Toujeo Max U-300 SoloStar) 300 unit/mL (3 mL) inpn     Sig: 10 Units by SubCUTAneous route nightly. Indications: type 2 diabetes mellitus     Dispense:  3 mL     Refill:  2       Patient verbalized understanding of the information presented and all of the patients questions were answered.   AVS was handed to the patient. Patient advised to call the office with any additional questions or concerns. Notifications of recommendations will be sent to Dr. Kanchan Alarcon NP for review. Patient will return to clinic in 2 week(s) for follow up. Kimberly Glez PharmD, [facility] [place]  Clinical [place]ist Specialist          For Pharmacy Admin Tracking Only     CPA in place:  Yes   Recommendation Provided To: Patient/Caregiver: 2 via Virtual Visit   Intervention Detail: Dose Adjustment: 1, reason: Therapy De-escalation and Scheduled Appointment   Intervention Accepted By: Patient/Caregiver: 2   Time Spent (min): 30

## 2022-05-17 ENCOUNTER — VIRTUAL VISIT (OUTPATIENT)
Dept: FAMILY MEDICINE CLINIC | Age: 27
End: 2022-05-17

## 2022-05-17 DIAGNOSIS — E11.65 UNCONTROLLED TYPE 2 DIABETES MELLITUS WITH HYPERGLYCEMIA (HCC): Primary | ICD-10-CM

## 2022-05-17 NOTE — PROGRESS NOTES
Pharmacy Progress Note - Diabetes Management    S/O: Mr. Eloina Prince is a 31 y.o. male, referred by Dr. Flynn Gillis, NINO, with a PMH of T2DM, was seen today for diabetes management.  Patient's last A1c was 10.9% (2022).          Video chat was not functioning for pt. Visit was completed via telephone. Interim update: Pt was last seen by this writer on 5/3/22 for f/up on DM. During visit, pt had maintained tight control. Insulin dose was decreased. Today pt was contacted for f/up on DM. He had decreased insulin. He has continued his low carb diet and kept up with his gym routine - Mon/Wed/Fri. Current anti-hyperglycemic regimen includes:    - Toujeo U-300 to 10 units daily  - Continue Metformin 1000 mg BID    ROS:  Today, Pt endorses:  - Symptoms of Hyperglycemia: none  - Symptoms of Hypoglycemia: none    Self Monitoring Blood Glucose (SMBG) or CGM:  - Brought in home glucometer/blood glucose log/CGM reader today:  no  Fasting 90s-115  PPB-115  Highest was 125    Nutrition:  - Adhering to low carb diet    Physical Activity:   yes  - Consists of gym 3x per week      Vitals: Wt Readings from Last 3 Encounters:   22 297 lb 12.8 oz (135.1 kg)   03/15/22 302 lb (137 kg)   22 306 lb (138.8 kg)     BP Readings from Last 3 Encounters:   22 121/67   03/15/22 (!) 140/80   22 120/76     Pulse Readings from Last 3 Encounters:   03/15/22 93   22 (!) 112   10/26/20 94       No past medical history on file. No Known Allergies    Current Outpatient Medications   Medication Sig    insulin glargine U-300 conc (Toujeo Max U-300 SoloStar) 300 unit/mL (3 mL) inpn 10 Units by SubCUTAneous route nightly. Indications: type 2 diabetes mellitus    metFORMIN (GLUCOPHAGE) 1,000 mg tablet Take 1,000 mg by mouth two (2) times daily (with meals).  rosuvastatin (CRESTOR) 10 mg tablet Take 1 Tablet by mouth nightly.     Insulin Needles, Disposable, 31 gauge x /16\" ndle UAD once daily. DX: E11.9    cinnamon bark (Cinnamon) 500 mg cap Take 2 Caplet by mouth.  Blood-Glucose Meter monitoring kit Test blood sugar twice daily. DX: E11.9. Substitute supply brand accepted by insurance.  lancets misc Test blood sugar twice daily. DX: E11.9. Substitute supply brand accepted by insurance.  glucose blood VI test strips (ASCENSIA AUTODISC VI, ONE TOUCH ULTRA TEST VI) strip Test blood sugar twice daily. DX: E11.9. Substitute supply brand accepted by insurance. No current facility-administered medications for this visit. Lab Results   Component Value Date/Time    Sodium 129 (L) 03/07/2022 10:59 AM    Potassium 4.4 03/07/2022 10:59 AM    Chloride 96 (L) 03/07/2022 10:59 AM    CO2 27 03/07/2022 10:59 AM    Anion gap 6 03/07/2022 10:59 AM    Glucose 386 (H) 03/07/2022 10:59 AM    BUN 11 03/07/2022 10:59 AM    Creatinine 1.15 03/07/2022 10:59 AM    BUN/Creatinine ratio 10 (L) 03/07/2022 10:59 AM    GFR est AA >60 03/07/2022 10:59 AM    GFR est non-AA >60 03/07/2022 10:59 AM    Calcium 10.0 03/07/2022 10:59 AM    Bilirubin, total 1.0 03/07/2022 10:59 AM    Alk. phosphatase 107 03/07/2022 10:59 AM    Protein, total 8.4 (H) 03/07/2022 10:59 AM    Albumin 4.5 03/07/2022 10:59 AM    Globulin 3.9 03/07/2022 10:59 AM    A-G Ratio 1.2 03/07/2022 10:59 AM    ALT (SGPT) 84 (H) 03/07/2022 10:59 AM     Lab Results   Component Value Date/Time    Cholesterol, total 369 (H) 03/07/2022 10:59 AM    HDL Cholesterol 34 03/07/2022 10:59 AM    LDL,Direct 84 03/07/2022 10:59 AM    LDL, calculated Not calculated due to elevated triglyceride level 03/07/2022 10:59 AM    VLDL, calculated  03/07/2022 10:59 AM     Calculation not valid with this patient's other Lipid values.     Triglyceride 1,620 (H) 03/07/2022 10:59 AM    CHOL/HDL Ratio 10.9 (H) 03/07/2022 10:59 AM     Lab Results   Component Value Date/Time    WBC 6.0 03/07/2022 10:59 AM    HGB 17.1 (H) 03/07/2022 10:59 AM    HCT 49.0 03/07/2022 10:59 AM PLATELET 868 80/72/3157 10:59 AM    MCV 82.6 03/07/2022 10:59 AM       No results found for: MCACR, MCA1, MCA2, MCA3, MCAU, MCAU2, MCALPOCT    No results found for: HBA1C, [id number]  Hemoglobin A1c (POC)   Date Value Ref Range Status   03/07/2022 10.9 % Final        CrCl cannot be calculated (Unknown ideal weight. ). A/P:    1) T2DM: Per ADA guidelines, Pt's A1c is not at goal of < 6.5%. Current SMBG(s)/CGM trend is tightly controlled even with significantly reduced insulin dose. Pt has been adherent to meds, low carb diet, and physical activity. Will stop insulin at this time and evaluate whether pt can maintain control on Metformin alone.  - STOP Toujeo U-300  - Continue Metformin 1000 mg BID      Medication reconciliation was completed during the visit. Medications Discontinued During This Encounter   Medication Reason    insulin glargine U-300 conc (Toujeo Max U-300 SoloStar) 300 unit/mL (3 mL) inpn Therapy Completed     No orders of the defined types were placed in this encounter. Patient verbalized understanding of the information presented and all of the patients questions were answered. AVS was handed to the patient. Patient advised to call the office with any additional questions or concerns. Notifications of recommendations will be sent to Dr. Khurram Ramírez, NP for review. Patient will return to clinic in 8 week(s) for follow up. Katelyn Tolliver, FilemonD, BCGP  Clinical Pharmacist Specialist          For Pharmacy Admin Tracking Only     CPA in place:  Yes   Recommendation Provided To: Patient/Caregiver: 2 via Virtual Visit   Intervention Detail: Discontinued Rx: 1, reason: Therapy De-escalation and Scheduled Appointment   Intervention Accepted By: Patient/Caregiver: 2   Time Spent (min): 30

## 2022-06-16 ENCOUNTER — OFFICE VISIT (OUTPATIENT)
Dept: FAMILY MEDICINE CLINIC | Age: 27
End: 2022-06-16
Payer: COMMERCIAL

## 2022-06-16 VITALS
DIASTOLIC BLOOD PRESSURE: 88 MMHG | RESPIRATION RATE: 16 BRPM | HEART RATE: 101 BPM | SYSTOLIC BLOOD PRESSURE: 130 MMHG | HEIGHT: 74 IN | TEMPERATURE: 98.3 F | WEIGHT: 304 LBS | BODY MASS INDEX: 39.01 KG/M2 | OXYGEN SATURATION: 97 %

## 2022-06-16 DIAGNOSIS — E11.9 CONTROLLED TYPE 2 DIABETES MELLITUS WITHOUT COMPLICATION, WITHOUT LONG-TERM CURRENT USE OF INSULIN (HCC): Primary | ICD-10-CM

## 2022-06-16 LAB — HBA1C MFR BLD HPLC: 5.5 %

## 2022-06-16 PROCEDURE — 3044F HG A1C LEVEL LT 7.0%: CPT | Performed by: NURSE PRACTITIONER

## 2022-06-16 PROCEDURE — 99213 OFFICE O/P EST LOW 20 MIN: CPT | Performed by: NURSE PRACTITIONER

## 2022-06-16 PROCEDURE — 83036 HEMOGLOBIN GLYCOSYLATED A1C: CPT | Performed by: NURSE PRACTITIONER

## 2022-06-16 RX ORDER — LANCETS
EACH MISCELLANEOUS
Qty: 1 EACH | Refills: 11 | Status: SHIPPED | OUTPATIENT
Start: 2022-06-16

## 2022-06-16 RX ORDER — PEN NEEDLE, DIABETIC 30 GX3/16"
NEEDLE, DISPOSABLE MISCELLANEOUS
Qty: 1 EACH | Refills: 11 | Status: CANCELLED | OUTPATIENT
Start: 2022-06-16

## 2022-06-16 NOTE — PROGRESS NOTES
Chief Complaint   Patient presents with    Diabetes     1. \"Have you been to the ER, urgent care clinic since your last visit? Hospitalized since your last visit? \" no    2. \"Have you seen or consulted any other health care providers outside of the [address] [place] [address] since your last visit? \"  no    3. For patients aged 39-70: Has the patient had a colonoscopy / FIT/ Cologuard? No gap      If the patient is female:    4. For patients aged 41-77: Has the patient had a mammogram within the past 2 years? 5. For patients aged 21-65: Has the patient had a pap smear?

## 2022-06-16 NOTE — PROGRESS NOTES
Assessment/Plan:     Diagnoses and all orders for this visit:    1. Controlled type 2 diabetes mellitus without complication, without long-term current use of insulin ([facility])  -     AMB POC HEMOGLOBIN A1C  -     lancets misc; Test blood sugar twice daily. DX: E11.9. Substitute supply brand accepted by insurance. -     glucose blood VI test strips (ASCENSIA AUTODISC VI, ONE TOUCH ULTRA TEST VI) strip; Test blood sugar twice daily. DX: E11.9. Substitute supply brand accepted by insurance. -     LIPID PANEL; Future  -     METABOLIC PANEL, COMPREHENSIVE; Future  -     MICROALBUMIN, UR, RAND W/ MICROALB/CREAT RATIO; Future       Stable A1C at 5.5%. Continue Metformin. Continue Crestor. Recheck lipids today. Due to radical lifestyle improvements, we could consider discontinuation of statin in future if labs are improved. Discussed expected course/resolution/complications of diagnosis in detail with patient. Medication risks/benefits/costs/interactions/alternatives discussed with patient. Pt was given after visit summary which includes diagnoses, current medications & vitals. Pt expressed understanding with the diagnosis and plan          Subjective:      Reece Koroma is a 31 y.o. male who presents for had concerns including Diabetes. Patient is a 31 y.o. male that comes today for follow up of Diabetes Mellitus Type 2. Patient does regularly monitor  their FSBG at home. The fasting plasma glucose (fpg) usually runs around 80s mg/L. generally follows a low fat low cholesterol diet  Patients activity level: routinely active  gained, 5 lbs. .  The patient has not experienced recent hypoglycemia. reports that he has never smoked.  He has never used smokeless tobacco.    No results found for: HBA1C, [id number]      Patient Active Problem List   Diagnosis Code    Controlled type 2 diabetes mellitus without complication, without long-term current use of insulin ([facility] [address] E11.9       Current Outpatient Medications   Medication Sig Dispense Refill    lancets misc Test blood sugar twice daily. DX: E11.9. Substitute supply brand accepted by insurance. 1 Each 11    glucose blood VI test strips (ASCENSIA AUTODISC VI, ONE TOUCH ULTRA TEST VI) strip Test blood sugar twice daily. DX: E11.9. Substitute supply brand accepted by insurance. 100 Strip 3    metFORMIN (GLUCOPHAGE) 1,000 mg tablet Take 1,000 mg by mouth two (2) times daily (with meals).  rosuvastatin (CRESTOR) 10 mg tablet Take 1 Tablet by mouth nightly. 90 Tablet 1    Blood-Glucose Meter monitoring kit Test blood sugar twice daily. DX: E11.9. Substitute supply brand accepted by insurance. 1 Kit 0       No Known Allergies    ROS:   Review of Systems   Constitutional: Negative for malaise/fatigue. Eyes: Negative for blurred vision. Respiratory: Negative for shortness of breath. Cardiovascular: Negative for chest pain. Objective:     Visit Vitals  BP (!) 140/88 (BP 1 Location: Left upper arm, BP Patient Position: Sitting, BP Cuff Size: Large adult)   Pulse (!) 101   Temp 98.3 °F (36.8 °C) (Temporal)   Resp 16   Ht 6' 2\" (1.88 m)   Wt 304 lb (137.9 kg)   SpO2 97%   BMI 39.03 kg/m²       Vitals and Nurse Documentation reviewed. Physical Exam  Constitutional:       General: He is not in acute distress. Cardiovascular:      Heart sounds: S1 normal and S2 normal. No murmur heard. No friction rub. No gallop. Pulmonary:      Effort: No respiratory distress. Breath sounds: Normal breath sounds. Skin:     General: Skin is warm and dry.    Psychiatric:         Mood and Affect: Mood and affect normal.

## 2022-06-17 LAB
ALBUMIN SERPL-MCNC: 4.6 G/DL (ref 3.5–5)
ALBUMIN/GLOB SERPL: 1.2 {RATIO} (ref 1.1–2.2)
ALP SERPL-CCNC: 72 U/L (ref 45–117)
ALT SERPL-CCNC: 32 U/L (ref 12–78)
ANION GAP SERPL CALC-SCNC: 8 MMOL/L (ref 5–15)
AST SERPL-CCNC: 11 U/L (ref 15–37)
BILIRUB SERPL-MCNC: 0.9 MG/DL (ref 0.2–1)
BUN SERPL-MCNC: 16 MG/DL (ref 6–20)
BUN/CREAT SERPL: 15 (ref 12–20)
CALCIUM SERPL-MCNC: 9.9 MG/DL (ref 8.5–10.1)
CHLORIDE SERPL-SCNC: 101 MMOL/L (ref 97–108)
CHOLEST SERPL-MCNC: 190 MG/DL
CO2 SERPL-SCNC: 27 MMOL/L (ref 21–32)
CREAT SERPL-MCNC: 1.05 MG/DL (ref 0.7–1.3)
CREAT UR-MCNC: 224 MG/DL
GLOBULIN SER CALC-MCNC: 3.7 G/DL (ref 2–4)
GLUCOSE SERPL-MCNC: 97 MG/DL (ref 65–100)
HDLC SERPL-MCNC: 52 MG/DL
HDLC SERPL: 3.7 {RATIO} (ref 0–5)
LDLC SERPL CALC-MCNC: 95.8 MG/DL (ref 0–100)
MICROALBUMIN UR-MCNC: 6.27 MG/DL
MICROALBUMIN/CREAT UR-RTO: 28 MG/G (ref 0–30)
POTASSIUM SERPL-SCNC: 4.4 MMOL/L (ref 3.5–5.1)
PROT SERPL-MCNC: 8.3 G/DL (ref 6.4–8.2)
SODIUM SERPL-SCNC: 136 MMOL/L (ref 136–145)
TRIGL SERPL-MCNC: 211 MG/DL (ref ?–150)
VLDLC SERPL CALC-MCNC: 42.2 MG/DL

## 2022-06-20 NOTE — PROGRESS NOTES
Dear Mr. Tricia Trent,    I wanted to follow up on your recent test results:    I am covering for [facility] NP as she is currently out of the office. Cholesterol is much better on your statin; please continue your medications for now.

## 2022-07-29 ENCOUNTER — TELEPHONE (OUTPATIENT)
Dept: FAMILY MEDICINE CLINIC | Age: 27
End: 2022-07-29

## 2022-07-29 NOTE — TELEPHONE ENCOUNTER
Pharmacy Progress Note - Telephone Encounter    S/O: Mr. Mahnaz Kurtz 31 y.o. male, referred by Dr. Judah Sneed NP, was contacted via an outbound telephone call to discuss rescheduling DM visit today. Verified patients identifiers (name & ) per HIPAA policy. - Pt amenable to reschedule    A/P:  - Rescheduled 22 at 9AM virtually  - Patient endorses understanding to the provided information. All questions answered at this time. There are no discontinued medications. No orders of the defined types were placed in this encounter.       Neftali Watson, PharmD, BCGP  Clinical Pharmacist Specialist      For Pharmacy Admin Tracking Only    CPA in place: Yes  Recommendation Provided To: Patient/Caregiver: 1 via Telephone  Intervention Detail: Scheduled Appointment  Intervention Accepted By: Patient/Caregiver: 1  Time Spent (min): 5

## 2022-08-02 ENCOUNTER — OFFICE VISIT (OUTPATIENT)
Dept: FAMILY MEDICINE CLINIC | Age: 27
End: 2022-08-02

## 2022-08-02 DIAGNOSIS — E11.9 CONTROLLED TYPE 2 DIABETES MELLITUS WITHOUT COMPLICATION, WITHOUT LONG-TERM CURRENT USE OF INSULIN (HCC): Primary | ICD-10-CM

## 2023-05-24 ENCOUNTER — OFFICE VISIT (OUTPATIENT)
Age: 28
End: 2023-05-24
Payer: COMMERCIAL

## 2023-05-24 VITALS
BODY MASS INDEX: 39.35 KG/M2 | DIASTOLIC BLOOD PRESSURE: 82 MMHG | TEMPERATURE: 98.2 F | OXYGEN SATURATION: 96 % | SYSTOLIC BLOOD PRESSURE: 136 MMHG | HEIGHT: 74 IN | WEIGHT: 306.6 LBS | RESPIRATION RATE: 16 BRPM | HEART RATE: 103 BPM

## 2023-05-24 DIAGNOSIS — E11.9 CONTROLLED TYPE 2 DIABETES MELLITUS WITHOUT COMPLICATION, WITHOUT LONG-TERM CURRENT USE OF INSULIN (HCC): ICD-10-CM

## 2023-05-24 DIAGNOSIS — Z00.00 ROUTINE GENERAL MEDICAL EXAMINATION AT A HEALTH CARE FACILITY: Primary | ICD-10-CM

## 2023-05-24 PROCEDURE — 99395 PREV VISIT EST AGE 18-39: CPT | Performed by: NURSE PRACTITIONER

## 2023-05-24 RX ORDER — GLUCOSAMINE HCL/CHONDROITIN SU 500-400 MG
CAPSULE ORAL
Qty: 100 STRIP | Refills: 3 | Status: SHIPPED | OUTPATIENT
Start: 2023-05-24

## 2023-05-24 RX ORDER — BLOOD-GLUCOSE METER
KIT MISCELLANEOUS
COMMUNITY
Start: 2022-03-07

## 2023-05-24 RX ORDER — LANCETS 30 GAUGE
EACH MISCELLANEOUS
Qty: 100 EACH | Refills: 3 | Status: SHIPPED | OUTPATIENT
Start: 2023-05-24

## 2023-05-24 SDOH — ECONOMIC STABILITY: FOOD INSECURITY: WITHIN THE PAST 12 MONTHS, YOU WORRIED THAT YOUR FOOD WOULD RUN OUT BEFORE YOU GOT MONEY TO BUY MORE.: NEVER TRUE

## 2023-05-24 SDOH — ECONOMIC STABILITY: INCOME INSECURITY: HOW HARD IS IT FOR YOU TO PAY FOR THE VERY BASICS LIKE FOOD, HOUSING, MEDICAL CARE, AND HEATING?: NOT HARD AT ALL

## 2023-05-24 SDOH — ECONOMIC STABILITY: HOUSING INSECURITY
IN THE LAST 12 MONTHS, WAS THERE A TIME WHEN YOU DID NOT HAVE A STEADY PLACE TO SLEEP OR SLEPT IN A SHELTER (INCLUDING NOW)?: NO

## 2023-05-24 SDOH — ECONOMIC STABILITY: FOOD INSECURITY: WITHIN THE PAST 12 MONTHS, THE FOOD YOU BOUGHT JUST DIDN'T LAST AND YOU DIDN'T HAVE MONEY TO GET MORE.: NEVER TRUE

## 2023-05-24 ASSESSMENT — PATIENT HEALTH QUESTIONNAIRE - PHQ9
5. POOR APPETITE OR OVEREATING: 0
4. FEELING TIRED OR HAVING LITTLE ENERGY: 0
SUM OF ALL RESPONSES TO PHQ QUESTIONS 1-9: 0
8. MOVING OR SPEAKING SO SLOWLY THAT OTHER PEOPLE COULD HAVE NOTICED. OR THE OPPOSITE, BEING SO FIGETY OR RESTLESS THAT YOU HAVE BEEN MOVING AROUND A LOT MORE THAN USUAL: 0
SUM OF ALL RESPONSES TO PHQ QUESTIONS 1-9: 0
SUM OF ALL RESPONSES TO PHQ QUESTIONS 1-9: 0
3. TROUBLE FALLING OR STAYING ASLEEP: 0
SUM OF ALL RESPONSES TO PHQ QUESTIONS 1-9: 0
SUM OF ALL RESPONSES TO PHQ9 QUESTIONS 1 & 2: 0
10. IF YOU CHECKED OFF ANY PROBLEMS, HOW DIFFICULT HAVE THESE PROBLEMS MADE IT FOR YOU TO DO YOUR WORK, TAKE CARE OF THINGS AT HOME, OR GET ALONG WITH OTHER PEOPLE: 0
9. THOUGHTS THAT YOU WOULD BE BETTER OFF DEAD, OR OF HURTING YOURSELF: 0
6. FEELING BAD ABOUT YOURSELF - OR THAT YOU ARE A FAILURE OR HAVE LET YOURSELF OR YOUR FAMILY DOWN: 0
2. FEELING DOWN, DEPRESSED OR HOPELESS: 0
7. TROUBLE CONCENTRATING ON THINGS, SUCH AS READING THE NEWSPAPER OR WATCHING TELEVISION: 0
1. LITTLE INTEREST OR PLEASURE IN DOING THINGS: 0

## 2023-05-25 LAB
ALBUMIN SERPL-MCNC: 4.4 G/DL (ref 3.5–5)
ALBUMIN/GLOB SERPL: 1.3 (ref 1.1–2.2)
ALP SERPL-CCNC: 86 U/L (ref 45–117)
ALT SERPL-CCNC: 51 U/L (ref 12–78)
ANION GAP SERPL CALC-SCNC: 5 MMOL/L (ref 5–15)
AST SERPL-CCNC: 39 U/L (ref 15–37)
BASOPHILS # BLD: 0 K/UL (ref 0–0.1)
BASOPHILS NFR BLD: 1 % (ref 0–1)
BILIRUB SERPL-MCNC: 0.9 MG/DL (ref 0.2–1)
BUN SERPL-MCNC: 13 MG/DL (ref 6–20)
BUN/CREAT SERPL: 13 (ref 12–20)
CALCIUM SERPL-MCNC: 9 MG/DL (ref 8.5–10.1)
CHLORIDE SERPL-SCNC: 100 MMOL/L (ref 97–108)
CHOLEST SERPL-MCNC: 333 MG/DL
CO2 SERPL-SCNC: 27 MMOL/L (ref 21–32)
CREAT SERPL-MCNC: 1.04 MG/DL (ref 0.7–1.3)
CREAT UR-MCNC: 240 MG/DL
DIFFERENTIAL METHOD BLD: NORMAL
EOSINOPHIL # BLD: 0.1 K/UL (ref 0–0.4)
EOSINOPHIL NFR BLD: 2 % (ref 0–7)
ERYTHROCYTE [DISTWIDTH] IN BLOOD BY AUTOMATED COUNT: 12.7 % (ref 11.5–14.5)
EST. AVERAGE GLUCOSE BLD GHB EST-MCNC: 220 MG/DL
GLOBULIN SER CALC-MCNC: 3.4 G/DL (ref 2–4)
GLUCOSE SERPL-MCNC: 306 MG/DL (ref 65–100)
HBA1C MFR BLD: 9.3 % (ref 4–5.6)
HCT VFR BLD AUTO: 48.4 % (ref 36.6–50.3)
HDLC SERPL-MCNC: 34 MG/DL
HDLC SERPL: 9.8 (ref 0–5)
HGB BLD-MCNC: 16.5 G/DL (ref 12.1–17)
IMM GRANULOCYTES # BLD AUTO: 0 K/UL (ref 0–0.04)
IMM GRANULOCYTES NFR BLD AUTO: 0 % (ref 0–0.5)
LDLC SERPL CALC-MCNC: ABNORMAL MG/DL (ref 0–100)
LDLC SERPL DIRECT ASSAY-MCNC: 71 MG/DL (ref 0–100)
LYMPHOCYTES # BLD: 2.2 K/UL (ref 0.8–3.5)
LYMPHOCYTES NFR BLD: 39 % (ref 12–49)
MCH RBC QN AUTO: 29.8 PG (ref 26–34)
MCHC RBC AUTO-ENTMCNC: 34.1 G/DL (ref 30–36.5)
MCV RBC AUTO: 87.4 FL (ref 80–99)
MICROALBUMIN UR-MCNC: 45.5 MG/DL
MICROALBUMIN/CREAT UR-RTO: 190 MG/G (ref 0–30)
MONOCYTES # BLD: 0.3 K/UL (ref 0–1)
MONOCYTES NFR BLD: 5 % (ref 5–13)
NEUTS SEG # BLD: 3 K/UL (ref 1.8–8)
NEUTS SEG NFR BLD: 53 % (ref 32–75)
NRBC # BLD: 0 K/UL (ref 0–0.01)
NRBC BLD-RTO: 0 PER 100 WBC
PLATELET # BLD AUTO: 283 K/UL (ref 150–400)
PMV BLD AUTO: 12.6 FL (ref 8.9–12.9)
POTASSIUM SERPL-SCNC: 4.1 MMOL/L (ref 3.5–5.1)
PROT SERPL-MCNC: 7.8 G/DL (ref 6.4–8.2)
RBC # BLD AUTO: 5.54 M/UL (ref 4.1–5.7)
SODIUM SERPL-SCNC: 132 MMOL/L (ref 136–145)
TRIGL SERPL-MCNC: 1787 MG/DL
VLDLC SERPL CALC-MCNC: ABNORMAL MG/DL
WBC # BLD AUTO: 5.7 K/UL (ref 4.1–11.1)

## 2023-05-26 RX ORDER — METFORMIN HYDROCHLORIDE 500 MG/1
500 TABLET, EXTENDED RELEASE ORAL 2 TIMES DAILY
Qty: 60 TABLET | Refills: 3 | Status: SHIPPED | OUTPATIENT
Start: 2023-05-26

## 2023-05-26 RX ORDER — ROSUVASTATIN CALCIUM 10 MG/1
10 TABLET, COATED ORAL DAILY
Qty: 90 TABLET | Refills: 3 | Status: SHIPPED | OUTPATIENT
Start: 2023-05-26

## 2023-05-31 ASSESSMENT — ENCOUNTER SYMPTOMS
DIARRHEA: 0
CONSTIPATION: 0
COUGH: 0
ABDOMINAL PAIN: 0
EYE PAIN: 0
BLOOD IN STOOL: 0
SHORTNESS OF BREATH: 0

## 2023-09-01 ENCOUNTER — NURSE TRIAGE (OUTPATIENT)
Dept: OTHER | Facility: CLINIC | Age: 28
End: 2023-09-01

## 2023-09-01 ENCOUNTER — OFFICE VISIT (OUTPATIENT)
Age: 28
End: 2023-09-01
Payer: COMMERCIAL

## 2023-09-01 VITALS
OXYGEN SATURATION: 97 % | DIASTOLIC BLOOD PRESSURE: 88 MMHG | WEIGHT: 304.2 LBS | BODY MASS INDEX: 39.04 KG/M2 | HEART RATE: 102 BPM | TEMPERATURE: 97.8 F | RESPIRATION RATE: 16 BRPM | HEIGHT: 74 IN | SYSTOLIC BLOOD PRESSURE: 137 MMHG

## 2023-09-01 DIAGNOSIS — R30.0 DYSURIA: Primary | ICD-10-CM

## 2023-09-01 DIAGNOSIS — Z20.2 EXPOSURE TO CHLAMYDIA: ICD-10-CM

## 2023-09-01 LAB
BILIRUBIN, URINE, POC: NEGATIVE
BLOOD URINE, POC: NORMAL
GLUCOSE URINE, POC: NEGATIVE
KETONES, URINE, POC: NEGATIVE
LEUKOCYTE ESTERASE, URINE, POC: NORMAL
NITRITE, URINE, POC: NEGATIVE
PH, URINE, POC: 5.5 (ref 4.6–8)
PROTEIN,URINE, POC: NORMAL
SPECIFIC GRAVITY, URINE, POC: 1.03 (ref 1–1.03)
URINALYSIS CLARITY, POC: NORMAL
URINALYSIS COLOR, POC: NORMAL
UROBILINOGEN, POC: NORMAL

## 2023-09-01 PROCEDURE — 81001 URINALYSIS AUTO W/SCOPE: CPT | Performed by: NURSE PRACTITIONER

## 2023-09-01 PROCEDURE — 99213 OFFICE O/P EST LOW 20 MIN: CPT | Performed by: NURSE PRACTITIONER

## 2023-09-01 PROCEDURE — G8427 DOCREV CUR MEDS BY ELIG CLIN: HCPCS | Performed by: NURSE PRACTITIONER

## 2023-09-01 PROCEDURE — G8417 CALC BMI ABV UP PARAM F/U: HCPCS | Performed by: NURSE PRACTITIONER

## 2023-09-01 PROCEDURE — 1036F TOBACCO NON-USER: CPT | Performed by: NURSE PRACTITIONER

## 2023-09-01 RX ORDER — DOXYCYCLINE HYCLATE 100 MG
100 TABLET ORAL 2 TIMES DAILY
Qty: 14 TABLET | Refills: 0 | Status: SHIPPED | OUTPATIENT
Start: 2023-09-01 | End: 2023-09-08

## 2023-09-01 NOTE — TELEPHONE ENCOUNTER
Location of patient: [place]    Received call from [facility]  at [facility] with [employer] Complaint. Subjective: Caller states \"penile discharge and burning with urination \"     Current Symptoms: penile discharge and burning with urination no fever , no nausea , no abd pain, states girlfriend dx with chlamydia hx diabetes     Onset: Wednesday     Associated Symptoms:  n/a     Pain Severity:  4/10 only with urination     Temperature: none         What has been tried: nothing    LMP: NA Pregnant: NA    Recommended disposition: See in Office Today    Care advice provided, patient verbalizes understanding; denies any other questions or concerns; instructed to call back for any new or worsening symptoms. Patient/Caller agrees with recommended disposition; writer provided warm transfer to [place] at [facility] for appointment scheduling    Attention Provider: Thank you for allowing me to participate in the care of your patient. The patient was connected to triage in response to information provided to the ECC/PSC. Please do not respond through this encounter as the response is not directed to a shared pool.          Reason for Disposition   Male with ANY of the following: * Burning (pain) with urination * Pus (white, yellow) or bloody discharge from end of penis* Testicle pain or swelling    Protocols used: STI Exposure-ADULT-[place]

## 2023-09-03 LAB
BACTERIA SPEC CULT: NORMAL
SERVICE CMNT-IMP: NORMAL

## 2023-09-06 LAB
C TRACH RRNA UR QL NAA+PROBE: NEGATIVE
M GENITALIUM DNA SPEC QL NAA+PROBE: NEGATIVE
N GONORRHOEA RRNA UR QL NAA+PROBE: POSITIVE
SPECIMEN SOURCE: 1826

## 2023-09-07 ENCOUNTER — NURSE ONLY (OUTPATIENT)
Age: 28
End: 2023-09-07

## 2023-09-07 DIAGNOSIS — A54.9 GONORRHEA: Primary | ICD-10-CM

## 2023-09-07 RX ORDER — CEFTRIAXONE 500 MG/1
500 INJECTION, POWDER, FOR SOLUTION INTRAMUSCULAR; INTRAVENOUS ONCE
Status: SHIPPED | OUTPATIENT
Start: 2023-09-07

## 2023-09-07 NOTE — PROGRESS NOTES
Chief Complaint   Patient presents with    Other     Antibiotic        500 mg of Ceftriaxone given in the left arm.

## 2023-12-06 ENCOUNTER — OFFICE VISIT (OUTPATIENT)
Age: 28
End: 2023-12-06
Payer: COMMERCIAL

## 2023-12-06 VITALS
DIASTOLIC BLOOD PRESSURE: 88 MMHG | SYSTOLIC BLOOD PRESSURE: 138 MMHG | HEIGHT: 74 IN | RESPIRATION RATE: 16 BRPM | WEIGHT: 303 LBS | TEMPERATURE: 97.3 F | OXYGEN SATURATION: 97 % | HEART RATE: 102 BPM | BODY MASS INDEX: 38.89 KG/M2

## 2023-12-06 DIAGNOSIS — J01.40 ACUTE NON-RECURRENT PANSINUSITIS: Primary | ICD-10-CM

## 2023-12-06 DIAGNOSIS — R06.83 SNORING: ICD-10-CM

## 2023-12-06 PROCEDURE — G8417 CALC BMI ABV UP PARAM F/U: HCPCS | Performed by: STUDENT IN AN ORGANIZED HEALTH CARE EDUCATION/TRAINING PROGRAM

## 2023-12-06 PROCEDURE — 99214 OFFICE O/P EST MOD 30 MIN: CPT | Performed by: STUDENT IN AN ORGANIZED HEALTH CARE EDUCATION/TRAINING PROGRAM

## 2023-12-06 PROCEDURE — G8427 DOCREV CUR MEDS BY ELIG CLIN: HCPCS | Performed by: STUDENT IN AN ORGANIZED HEALTH CARE EDUCATION/TRAINING PROGRAM

## 2023-12-06 PROCEDURE — 1036F TOBACCO NON-USER: CPT | Performed by: STUDENT IN AN ORGANIZED HEALTH CARE EDUCATION/TRAINING PROGRAM

## 2023-12-06 PROCEDURE — G8484 FLU IMMUNIZE NO ADMIN: HCPCS | Performed by: STUDENT IN AN ORGANIZED HEALTH CARE EDUCATION/TRAINING PROGRAM

## 2023-12-06 RX ORDER — AMOXICILLIN AND CLAVULANATE POTASSIUM 875; 125 MG/1; MG/1
1 TABLET, FILM COATED ORAL 2 TIMES DAILY
Qty: 14 TABLET | Refills: 0 | Status: SHIPPED | OUTPATIENT
Start: 2023-12-06 | End: 2023-12-13

## 2023-12-06 ASSESSMENT — PATIENT HEALTH QUESTIONNAIRE - PHQ9
SUM OF ALL RESPONSES TO PHQ QUESTIONS 1-9: 0
SUM OF ALL RESPONSES TO PHQ QUESTIONS 1-9: 0
2. FEELING DOWN, DEPRESSED OR HOPELESS: 0
SUM OF ALL RESPONSES TO PHQ QUESTIONS 1-9: 0
SUM OF ALL RESPONSES TO PHQ QUESTIONS 1-9: 0

## 2024-01-23 ENCOUNTER — TELEPHONE (OUTPATIENT)
Age: 29
End: 2024-01-23

## 2024-01-23 NOTE — TELEPHONE ENCOUNTER
Contacted patient, no answer. LVM for patient to contact office to schedule an appointment.    When gary is returned schedule appointment with PCP for diabetes follow up.

## 2024-02-23 RX ORDER — METFORMIN HYDROCHLORIDE 500 MG/1
500 TABLET, EXTENDED RELEASE ORAL 2 TIMES DAILY
Qty: 60 TABLET | Refills: 3 | OUTPATIENT
Start: 2024-02-23

## 2024-02-23 NOTE — TELEPHONE ENCOUNTER
PCP: Randa Gonsalves APRN - NP      No future appointments.    Requested Prescriptions     Pending Prescriptions Disp Refills    metFORMIN (GLUCOPHAGE-XR) 500 MG extended release tablet 60 tablet 3     Sig: Take 1 tablet by mouth in the morning and at bedtime      Last seen at 05/24/2023

## 2024-02-29 RX ORDER — METFORMIN HYDROCHLORIDE 500 MG/1
500 TABLET, EXTENDED RELEASE ORAL 2 TIMES DAILY
Qty: 60 TABLET | Refills: 3 | Status: CANCELLED | OUTPATIENT
Start: 2024-02-29

## 2024-03-01 NOTE — TELEPHONE ENCOUNTER
Mychart message sent to patient to schedule OV for refill.  Once scheduled, NIALL Gonsalves may approve refill til appt.  kyaw Wells    Deleted rx request for metformin.  Will wait for patient to schedule OV.  Kyaw Wells    For Pharmacy Admin Tracking Only    Program: Medication Refill  CPA in place:    Recommendation Provided To:   Intervention Detail: Discontinued Rx: 1, reason: Patient Preference  Intervention Accepted By:   Gap Closed?:    Time Spent (min): 5

## 2024-05-09 ENCOUNTER — OFFICE VISIT (OUTPATIENT)
Age: 29
End: 2024-05-09
Payer: COMMERCIAL

## 2024-05-09 VITALS
SYSTOLIC BLOOD PRESSURE: 132 MMHG | OXYGEN SATURATION: 97 % | HEART RATE: 113 BPM | HEIGHT: 74 IN | BODY MASS INDEX: 37.53 KG/M2 | TEMPERATURE: 97.7 F | DIASTOLIC BLOOD PRESSURE: 87 MMHG | RESPIRATION RATE: 16 BRPM | WEIGHT: 292.4 LBS

## 2024-05-09 DIAGNOSIS — E11.65 UNCONTROLLED TYPE 2 DIABETES MELLITUS WITH HYPERGLYCEMIA (HCC): Primary | ICD-10-CM

## 2024-05-09 PROCEDURE — 3046F HEMOGLOBIN A1C LEVEL >9.0%: CPT | Performed by: NURSE PRACTITIONER

## 2024-05-09 PROCEDURE — G8427 DOCREV CUR MEDS BY ELIG CLIN: HCPCS | Performed by: NURSE PRACTITIONER

## 2024-05-09 PROCEDURE — 99214 OFFICE O/P EST MOD 30 MIN: CPT | Performed by: NURSE PRACTITIONER

## 2024-05-09 PROCEDURE — 2022F DILAT RTA XM EVC RTNOPTHY: CPT | Performed by: NURSE PRACTITIONER

## 2024-05-09 PROCEDURE — G8417 CALC BMI ABV UP PARAM F/U: HCPCS | Performed by: NURSE PRACTITIONER

## 2024-05-09 PROCEDURE — 1036F TOBACCO NON-USER: CPT | Performed by: NURSE PRACTITIONER

## 2024-05-09 RX ORDER — LANCETS 30 GAUGE
1 EACH MISCELLANEOUS DAILY
Qty: 100 EACH | Refills: 5 | Status: SHIPPED | OUTPATIENT
Start: 2024-05-09

## 2024-05-09 RX ORDER — ROSUVASTATIN CALCIUM 10 MG/1
10 TABLET, COATED ORAL DAILY
Qty: 90 TABLET | Refills: 3 | Status: SHIPPED | OUTPATIENT
Start: 2024-05-09

## 2024-05-09 RX ORDER — GLUCOSAMINE HCL/CHONDROITIN SU 500-400 MG
CAPSULE ORAL
Qty: 100 STRIP | Refills: 3 | Status: SHIPPED | OUTPATIENT
Start: 2024-05-09

## 2024-05-09 RX ORDER — INSULIN GLARGINE 300 U/ML
30 INJECTION, SOLUTION SUBCUTANEOUS DAILY
Qty: 3 ADJUSTABLE DOSE PRE-FILLED PEN SYRINGE | Refills: 2 | Status: SHIPPED | OUTPATIENT
Start: 2024-05-09

## 2024-05-09 RX ORDER — PEN NEEDLE, DIABETIC 31 G X1/4"
1 NEEDLE, DISPOSABLE MISCELLANEOUS DAILY
Qty: 100 EACH | Refills: 3 | Status: SHIPPED | OUTPATIENT
Start: 2024-05-09

## 2024-05-09 RX ORDER — METFORMIN HYDROCHLORIDE 500 MG/1
500 TABLET, EXTENDED RELEASE ORAL
Qty: 30 TABLET | Refills: 1 | Status: SHIPPED | OUTPATIENT
Start: 2024-05-09 | End: 2024-05-15 | Stop reason: SDUPTHER

## 2024-05-09 ASSESSMENT — PATIENT HEALTH QUESTIONNAIRE - PHQ9
9. THOUGHTS THAT YOU WOULD BE BETTER OFF DEAD, OR OF HURTING YOURSELF: NOT AT ALL
SUM OF ALL RESPONSES TO PHQ QUESTIONS 1-9: 0
8. MOVING OR SPEAKING SO SLOWLY THAT OTHER PEOPLE COULD HAVE NOTICED. OR THE OPPOSITE, BEING SO FIGETY OR RESTLESS THAT YOU HAVE BEEN MOVING AROUND A LOT MORE THAN USUAL: NOT AT ALL
1. LITTLE INTEREST OR PLEASURE IN DOING THINGS: NOT AT ALL
7. TROUBLE CONCENTRATING ON THINGS, SUCH AS READING THE NEWSPAPER OR WATCHING TELEVISION: NOT AT ALL
SUM OF ALL RESPONSES TO PHQ QUESTIONS 1-9: 0
2. FEELING DOWN, DEPRESSED OR HOPELESS: NOT AT ALL
SUM OF ALL RESPONSES TO PHQ QUESTIONS 1-9: 0
6. FEELING BAD ABOUT YOURSELF - OR THAT YOU ARE A FAILURE OR HAVE LET YOURSELF OR YOUR FAMILY DOWN: NOT AT ALL
10. IF YOU CHECKED OFF ANY PROBLEMS, HOW DIFFICULT HAVE THESE PROBLEMS MADE IT FOR YOU TO DO YOUR WORK, TAKE CARE OF THINGS AT HOME, OR GET ALONG WITH OTHER PEOPLE: NOT DIFFICULT AT ALL
SUM OF ALL RESPONSES TO PHQ9 QUESTIONS 1 & 2: 0
4. FEELING TIRED OR HAVING LITTLE ENERGY: NOT AT ALL
3. TROUBLE FALLING OR STAYING ASLEEP: NOT AT ALL
SUM OF ALL RESPONSES TO PHQ QUESTIONS 1-9: 0
5. POOR APPETITE OR OVEREATING: NOT AT ALL

## 2024-05-09 NOTE — PROGRESS NOTES
Chief Complaint   Patient presents with    Follow-up    Blood Work     \"Have you been to the ER, urgent care clinic since your last visit?  Hospitalized since your last visit?\"    2 months ago at [facility] ED     “Have you seen or consulted any other health care providers outside of [facility] since your last visit?”    NO            
Rhythm: Normal rate.      Heart sounds: No murmur heard.     No friction rub. No gallop.   Pulmonary:      Effort: Pulmonary effort is normal.      Breath sounds: Normal breath sounds.   Neurological:      Mental Status: He is alert.   Psychiatric:         Behavior: Behavior normal.         Thought Content: Thought content normal.

## 2024-05-15 ENCOUNTER — OFFICE VISIT (OUTPATIENT)
Age: 29
End: 2024-05-15
Payer: COMMERCIAL

## 2024-05-15 VITALS
HEIGHT: 74 IN | HEART RATE: 90 BPM | SYSTOLIC BLOOD PRESSURE: 124 MMHG | RESPIRATION RATE: 16 BRPM | DIASTOLIC BLOOD PRESSURE: 81 MMHG | WEIGHT: 296 LBS | OXYGEN SATURATION: 95 % | BODY MASS INDEX: 37.99 KG/M2 | TEMPERATURE: 98.1 F

## 2024-05-15 DIAGNOSIS — E11.65 UNCONTROLLED TYPE 2 DIABETES MELLITUS WITH HYPERGLYCEMIA (HCC): Primary | ICD-10-CM

## 2024-05-15 DIAGNOSIS — Z86.19 HISTORY OF CHLAMYDIA: ICD-10-CM

## 2024-05-15 PROCEDURE — 1036F TOBACCO NON-USER: CPT | Performed by: NURSE PRACTITIONER

## 2024-05-15 PROCEDURE — 99213 OFFICE O/P EST LOW 20 MIN: CPT | Performed by: NURSE PRACTITIONER

## 2024-05-15 PROCEDURE — 3046F HEMOGLOBIN A1C LEVEL >9.0%: CPT | Performed by: NURSE PRACTITIONER

## 2024-05-15 PROCEDURE — 2022F DILAT RTA XM EVC RTNOPTHY: CPT | Performed by: NURSE PRACTITIONER

## 2024-05-15 PROCEDURE — G8417 CALC BMI ABV UP PARAM F/U: HCPCS | Performed by: NURSE PRACTITIONER

## 2024-05-15 PROCEDURE — G8427 DOCREV CUR MEDS BY ELIG CLIN: HCPCS | Performed by: NURSE PRACTITIONER

## 2024-05-15 RX ORDER — METFORMIN HYDROCHLORIDE 500 MG/1
1000 TABLET, EXTENDED RELEASE ORAL
Qty: 180 TABLET | Refills: 1 | Status: SHIPPED | OUTPATIENT
Start: 2024-05-15

## 2024-05-15 NOTE — PROGRESS NOTES
Assessment/Plan:     1. Uncontrolled type 2 diabetes mellitus with hyperglycemia (HCC)  -     Comprehensive Metabolic Panel; Future  -     Lipid Panel; Future  -     Microalbumin / Creatinine Urine Ratio; Future  -     metFORMIN (GLUCOPHAGE-XR) 500 MG extended release tablet; Take 2 tablets by mouth daily (with breakfast), Disp-180 tablet, R-1Normal  Rapidly improving.  Continue Tresiba 30 units QHS.  Add metformin and taper to 4 tablets over the next 4 weeks.  If sugars fasting reach <100, decrease tresiba to 20 units.   2. History of chlamydia  -     CT/NG/T.Vaginalis Amplification; Future   Previously treated.  Check for clearance.     No follow-ups on file.     Discussed expected course/resolution/complications of diagnosis in detail with patient.    Medication risks/benefits/costs/interactions/alternatives discussed with patient.    Pt was given after visit summary which includes diagnoses, current medications & vitals.   Pt expressed understanding with the diagnosis and plan          Subjective:      Rinku Raphael is a 28 y.o. male who presents for had concerns including Follow-up.     He is here for follow up of diabetes.      Fasting- 180-215s    He is tolerating the addition of treatment well without concerns.     He has made rapid and positive lifestyle changes including exercise and eating healthy.         Patient Active Problem List   Diagnosis    Controlled type 2 diabetes mellitus without complication, without long-term current use of insulin (HCC)       Current Outpatient Medications   Medication Sig Dispense Refill    metFORMIN (GLUCOPHAGE-XR) 500 MG extended release tablet Take 2 tablets by mouth daily (with breakfast) 180 tablet 1    Insulin Glargine, 2 Unit Dial, (TOUJEO MAX SOLOSTAR) 300 UNIT/ML SOPN Inject 30 Units into the skin daily 3 Adjustable Dose Pre-filled Pen Syringe 2    rosuvastatin (CRESTOR) 10 MG tablet Take 1 tablet by mouth daily 90 tablet 3    Insulin Pen Needle ([employer] PEN

## 2024-05-16 LAB
ALBUMIN SERPL-MCNC: 4.3 G/DL (ref 3.5–5)
ALBUMIN/GLOB SERPL: 1.3 (ref 1.1–2.2)
ALP SERPL-CCNC: 76 U/L (ref 45–117)
ALT SERPL-CCNC: 61 U/L (ref 12–78)
ANION GAP SERPL CALC-SCNC: 5 MMOL/L (ref 5–15)
AST SERPL-CCNC: 53 U/L (ref 15–37)
BILIRUB SERPL-MCNC: 1.4 MG/DL (ref 0.2–1)
BUN SERPL-MCNC: 11 MG/DL (ref 6–20)
BUN/CREAT SERPL: 11 (ref 12–20)
CALCIUM SERPL-MCNC: 9.6 MG/DL (ref 8.5–10.1)
CHLORIDE SERPL-SCNC: 106 MMOL/L (ref 97–108)
CHOLEST SERPL-MCNC: 259 MG/DL
CO2 SERPL-SCNC: 27 MMOL/L (ref 21–32)
CREAT SERPL-MCNC: 0.99 MG/DL (ref 0.7–1.3)
CREAT UR-MCNC: 256 MG/DL
GLOBULIN SER CALC-MCNC: 3.4 G/DL (ref 2–4)
GLUCOSE SERPL-MCNC: 134 MG/DL (ref 65–100)
HDLC SERPL-MCNC: 35 MG/DL
HDLC SERPL: 7.4 (ref 0–5)
LDLC SERPL CALC-MCNC: ABNORMAL MG/DL (ref 0–100)
LDLC SERPL DIRECT ASSAY-MCNC: 97 MG/DL (ref 0–100)
MICROALBUMIN UR-MCNC: 7.23 MG/DL
MICROALBUMIN/CREAT UR-RTO: 28 MG/G (ref 0–30)
POTASSIUM SERPL-SCNC: 4.1 MMOL/L (ref 3.5–5.1)
PROT SERPL-MCNC: 7.7 G/DL (ref 6.4–8.2)
SODIUM SERPL-SCNC: 138 MMOL/L (ref 136–145)
TRIGL SERPL-MCNC: 442 MG/DL
VLDLC SERPL CALC-MCNC: ABNORMAL MG/DL

## 2024-05-18 LAB
C TRACH RRNA SPEC QL NAA+PROBE: NEGATIVE
N GONORRHOEA RRNA SPEC QL NAA+PROBE: NEGATIVE
SPECIMEN SOURCE: NORMAL
T VAGINALIS RRNA SPEC QL NAA+PROBE: NEGATIVE

## 2024-05-24 ASSESSMENT — ENCOUNTER SYMPTOMS: SHORTNESS OF BREATH: 0

## 2024-06-06 ENCOUNTER — TELEPHONE (OUTPATIENT)
Age: 29
End: 2024-06-06

## 2024-06-06 DIAGNOSIS — E11.65 UNCONTROLLED TYPE 2 DIABETES MELLITUS WITH HYPERGLYCEMIA (HCC): ICD-10-CM

## 2024-06-06 RX ORDER — INSULIN GLARGINE 300 U/ML
30 INJECTION, SOLUTION SUBCUTANEOUS DAILY
Qty: 3 ADJUSTABLE DOSE PRE-FILLED PEN SYRINGE | Refills: 2 | Status: SHIPPED | OUTPATIENT
Start: 2024-06-06

## 2024-06-06 RX ORDER — METFORMIN HYDROCHLORIDE 500 MG/1
1000 TABLET, EXTENDED RELEASE ORAL
Qty: 180 TABLET | Refills: 1 | Status: SHIPPED | OUTPATIENT
Start: 2024-06-06

## 2024-06-06 NOTE — TELEPHONE ENCOUNTER
Pharmacy Progress Note - Telephone Encounter    S/O: Mr. Rinku Raphael 28 y.o. male, referred by Dr. Gonsalves, GEREMIAS Walker - NP, was contacted via an outbound telephone call to discuss scheduling DM visit with PharmD today. Verified patient’s identifiers (name & ) per HIPAA policy.     - Pt amenable to scheduling visit with PharmD    Pt states he has a few days left of diabetes meds.  He requests refills.    A/P:  - Scheduled on 6/10/24 at 9:30AM - virtual  - Refills of Toujeo U-300 and Metformin provided  - Patient endorses understanding to the provided information. All questions answered at this time.     Medications Discontinued During This Encounter   Medication Reason    Insulin Glargine, 2 Unit Dial, (TOUJEO MAX SOLOSTAR) 300 UNIT/ML SOPN REORDER    metFORMIN (GLUCOPHAGE-XR) 500 MG extended release tablet REORDER     Orders Placed This Encounter    Insulin Glargine, 2 Unit Dial, (TOUJEO MAX SOLOSTAR) 300 UNIT/ML SOPN     Sig: Inject 30 Units into the skin daily     Dispense:  3 Adjustable Dose Pre-filled Pen Syringe     Refill:  2    metFORMIN (GLUCOPHAGE-XR) 500 MG extended release tablet     Sig: Take 2 tablets by mouth daily (with breakfast)     Dispense:  180 tablet     Refill:  1        Clari Patel PharmD, BCGP, BCACP  Clinical Pharmacist Specialist      For Pharmacy Admin Tracking Only    Program: Medical Group  CPA in place:  Yes  Recommendation Provided To: Patient/Caregiver: 2 via Telephone  Intervention Detail: Refill(s) Provided and Scheduled Appointment  Intervention Accepted By: Patient/Caregiver: 2  Time Spent (min): 5

## 2024-06-10 ENCOUNTER — PHARMACY VISIT (OUTPATIENT)
Age: 29
End: 2024-06-10

## 2024-06-10 ENCOUNTER — PATIENT MESSAGE (OUTPATIENT)
Age: 29
End: 2024-06-10

## 2024-06-10 DIAGNOSIS — E11.65 UNCONTROLLED TYPE 2 DIABETES MELLITUS WITH HYPERGLYCEMIA (HCC): Primary | ICD-10-CM

## 2024-06-10 RX ORDER — INSULIN GLARGINE 300 U/ML
25 INJECTION, SOLUTION SUBCUTANEOUS DAILY
Qty: 3 ADJUSTABLE DOSE PRE-FILLED PEN SYRINGE | Refills: 2
Start: 2024-06-10

## 2024-06-10 RX ORDER — SEMAGLUTIDE 0.68 MG/ML
0.25 INJECTION, SOLUTION SUBCUTANEOUS WEEKLY
Qty: 3 ML | Refills: 2 | Status: SHIPPED | OUTPATIENT
Start: 2024-06-10

## 2024-06-10 NOTE — PROGRESS NOTES
Pharmacy Progress Note - Diabetes Management    S/O: Mr. Rinku Raphael is a 28 y.o. male, referred by Dr. Gonsalves, GEREMIAS Walker - NP, with a PMH of T2DM, was seen today for diabetes management.  Patient's last A1c was 9.3% (May 2023).       Visit was completed using real time audio visual technology, Movellashart video.    Interim update: Pt has been checking his BG at home and making changes to his diet and exercise routine since coming back home from year long work assignment in [place].    He denies adverse effects with increasing Metformin dose.  Increased dose 4 days ago.      Current anti-hyperglycemic regimen includes:    - Metformin  mg 1 tab BID  - Toujeo U-300 30 units QHS    ROS:  Today, Pt endorses:  - Symptoms of Hyperglycemia: none  - Symptoms of Hypoglycemia: none    Self Monitoring Blood Glucose (SMBG) or CGM:  - Brought in home glucometer/blood glucose log/CGM reader today:  no  Fasting 130s  Evening (after gym) 110-115, no gym 120  Sometimes in 180s - when initially started back on med    Nutrition:  - Breakfast: spinach with grilled chicken, tomato, cucumber, caesar  - Lunch: oven roasted turkey sandwich or quesadilla (grilled chicken)  - Dinner: spinach salad with chicken with caesar dressing with hard boiled egg white  - Beverage(s): water, zero gatorade, coffee   - Alcohol consumption? No    Physical Activity:   yes  MWF - COURTNEY on treadmill 10-15 min, weights  TR - walk on treadmill with incline for 30 minutes      Vitals:  Wt Readings from Last 3 Encounters:   05/15/24 134.3 kg (296 lb)   05/09/24 132.6 kg (292 lb 6.4 oz)   12/06/23 (!) 137.4 kg (303 lb)     BP Readings from Last 3 Encounters:   05/15/24 124/81   05/09/24 132/87   12/06/23 138/88     Pulse Readings from Last 3 Encounters:   05/15/24 90   05/09/24 (!) 113   12/06/23 (!) 102       No past medical history on file.  No Known Allergies    Current Outpatient Medications   Medication Sig    Insulin Glargine, 2 Unit Dial, (TOUJEO MAX

## 2024-06-10 NOTE — TELEPHONE ENCOUNTER
For Pharmacy Admin Tracking Only    Program: Medical Group  CPA in place:  Yes  Recommendation Provided To: Patient/Caregiver: 2 via mig33 Message  Intervention Detail: Benefit Assistance and Patient Access Assistance/Sample Provided  Intervention Accepted By: Patient/Caregiver: 2  Time Spent (min): 5

## 2024-10-21 LAB
ESTIMATED AVERAGE GLUCOSE: NORMAL
HBA1C MFR BLD: 7.3 %

## 2025-01-29 DIAGNOSIS — E11.65 UNCONTROLLED TYPE 2 DIABETES MELLITUS WITH HYPERGLYCEMIA (HCC): ICD-10-CM

## 2025-01-29 RX ORDER — INSULIN GLARGINE 300 U/ML
25 INJECTION, SOLUTION SUBCUTANEOUS DAILY
Qty: 3 ADJUSTABLE DOSE PRE-FILLED PEN SYRINGE | Refills: 2 | Status: SHIPPED | OUTPATIENT
Start: 2025-01-29

## 2025-01-29 NOTE — TELEPHONE ENCOUNTER
Last appointment: 5/15/24 [facility]  Next appointment: 2/12/25 [facility]  Previous refill encounter(s): 6/10/24 3 pens + 2    Requested Prescriptions     Pending Prescriptions Disp Refills    Insulin Glargine, 2 Unit Dial, (TOUJEO MAX SOLOSTAR) 300 UNIT/ML concentrated injection pen 3 Adjustable Dose Pre-filled Pen Syringe 0     Sig: Inject 25 Units into the skin daily     For Pharmacy Admin Tracking Only    Program: Medication Refill  CPA in place:    Recommendation Provided To:   Intervention Detail: New Rx: 1, reason: Patient Preference  Intervention Accepted By:   Gap Closed?:    Time Spent (min): 5

## 2025-06-05 NOTE — TELEPHONE ENCOUNTER
Continued Stay SW/CM Assessment/Plan of Care Note       Active Substitute Decision Maker (SDM)    There are no active Substitute Decision Maker (SDM) on file.           Progress note:  Patient discussed in MDR's. NG tube has been removed. Patient able to take sips of water and eat ice chips. Patient continues to receive IV fluids and is being supplemented for K+ and Na+phos. Patient did have two BM's yesterday and abdomen with decreased distension. Plan for patient to start a diet tomorrow. DAISY~6/9/25.     RN CM to continue to follow.     See SW/CM flowsheets for other objective data.    Disposition Recommendations:  Preliminary discharge destination:    SW/CM recommendation for discharge: Home, Home therapy, OP therapy    Destination Pharmacy:        Discharge Plan/Needs:     Continued Care and Services - Admitted Since 6/1/2025    No active coordination exists for this encounter.       Continued Care and Services - Linked Episodes Includes continued care and service providers from the active episodes linked to this encounter, which are listed below      Care Transitions Episode start date: 6/1/2025   There are no active outsourced providers for this episode.                     Devices/ Equipment that need to be arranged for discharge:     Accepted   Pending insurance authorization   Others:    Anticipated date of DME availability:     Prior To Hospitalization:    Living Situation: Spouse and residing at House    .  Support Systems: Family members, Spouse   Home Devices/Equipment: None            Mobility Assist Devices: None   Type of Service Prior to Hospitalization: Outpatient services               Patient/Family discharge goal (s):  Home     Resources provided:           Prior Function  Bed Mobility: Modified Independent (06/02/25 1100 : Kerry Vela, OT)  Transfers: Modified Independent (06/02/25 0903 : Carlee Rodgers, PT)  Ambulation in the Home: Modified  Independent (06/02/25 0903 : Carlee Rodgers  [facility] sending request for pen needles RX for patient's daily Toujeo. New rx for [facility] sent 3/7/22. No pen needles on med list.    LOV: 3/15/22 NINO Rodgers    Please send new rx for pen needles or advise which size/gauge needed.      Thanks, Gilmar Quispe L, PT)       Current Function  Last Filed Values         Value Time User    Current Function  slightly below baseline level of function 6/4/2025 10:33 AM Juliana Pemberton PTA    Therapy Impairments  activity tolerance; balance; strength; safety awareness 6/4/2025 10:33 AM Juliana Pemberton, STARR    ADLs Requiring Support  bed mobility; transfers; ambulation; stairs 6/4/2025 10:33 AM Juliana Pemberton PTA            Therapy Recommendations for Discharge:   PT:      Last Filed Values         Value Time User    PT Discharge Needs  therapy 1-3 times per week 6/4/2025 10:33 AM Juliana Pemberton PTA          OT:       Last Filed Values         Value Time User    OT Discharge Needs  therapy 1-3 times per week 6/4/2025  2:45 PM Savanna Martin OT          SLP:    Last Filed Values       None            Mobility Equipment Recommended for Discharge: has 2ww      Barriers to Discharge  Identified Barriers to Discharge/Transition Planning: